# Patient Record
Sex: FEMALE | Race: WHITE | NOT HISPANIC OR LATINO | Employment: FULL TIME | URBAN - NONMETROPOLITAN AREA
[De-identification: names, ages, dates, MRNs, and addresses within clinical notes are randomized per-mention and may not be internally consistent; named-entity substitution may affect disease eponyms.]

---

## 2018-06-04 ENCOUNTER — OFFICE VISIT CONVERTED (OUTPATIENT)
Dept: FAMILY MEDICINE CLINIC | Age: 38
End: 2018-06-04
Attending: NURSE PRACTITIONER

## 2018-12-03 ENCOUNTER — OFFICE VISIT CONVERTED (OUTPATIENT)
Dept: FAMILY MEDICINE CLINIC | Age: 38
End: 2018-12-03
Attending: NURSE PRACTITIONER

## 2019-01-10 ENCOUNTER — HOSPITAL ENCOUNTER (OUTPATIENT)
Dept: LAB | Facility: HOSPITAL | Age: 39
Discharge: HOME | End: 2019-01-10

## 2019-01-11 LAB — VZV IGG SER IA-ACNC: 2917 INDEX

## 2019-01-31 ENCOUNTER — HOSPITAL ENCOUNTER (OUTPATIENT)
Dept: LAB | Facility: HOSPITAL | Age: 39
Discharge: HOME | End: 2019-01-31

## 2019-01-31 LAB
ALBUMIN SERPL-MCNC: 4.2 G/DL (ref 3.5–5)
ALBUMIN/GLOB SERPL: 1.4 {RATIO} (ref 1.4–2.6)
ALP SERPL-CCNC: 80 U/L (ref 42–98)
ALT SERPL-CCNC: 20 U/L (ref 10–40)
ANION GAP SERPL CALC-SCNC: 16 MMOL/L (ref 8–19)
AST SERPL-CCNC: 21 U/L (ref 15–50)
BASOPHILS # BLD AUTO: 0.05 10*3/UL (ref 0–0.2)
BASOPHILS NFR BLD AUTO: 0.54 % (ref 0–3)
BILIRUB SERPL-MCNC: 0.29 MG/DL (ref 0.2–1.3)
BUN SERPL-MCNC: 11 MG/DL (ref 5–25)
BUN/CREAT SERPL: 15 {RATIO} (ref 6–20)
CALCIUM SERPL-MCNC: 8.9 MG/DL (ref 8.7–10.4)
CHLORIDE SERPL-SCNC: 104 MMOL/L (ref 99–111)
CHOLEST SERPL-MCNC: 157 MG/DL (ref 107–200)
CHOLEST/HDLC SERPL: 3.7 {RATIO} (ref 3–6)
CONV CO2: 23 MMOL/L (ref 22–32)
CONV TOTAL PROTEIN: 7.2 G/DL (ref 6.3–8.2)
CREAT UR-MCNC: 0.71 MG/DL (ref 0.5–0.9)
EOSINOPHIL # BLD AUTO: 0.21 10*3/UL (ref 0–0.7)
EOSINOPHIL # BLD AUTO: 2.13 % (ref 0–7)
ERYTHROCYTE [DISTWIDTH] IN BLOOD BY AUTOMATED COUNT: 14 % (ref 11.5–14.5)
EST. AVERAGE GLUCOSE BLD GHB EST-MCNC: 111 MG/DL
GFR SERPLBLD BASED ON 1.73 SQ M-ARVRAT: >60 ML/MIN/{1.73_M2}
GLOBULIN UR ELPH-MCNC: 3 G/DL (ref 2–3.5)
GLUCOSE SERPL-MCNC: 101 MG/DL (ref 65–99)
HBA1C MFR BLD: 11 G/DL (ref 12–16)
HBA1C MFR BLD: 5.5 % (ref 3.5–5.7)
HCT VFR BLD AUTO: 33 % (ref 37–47)
HDLC SERPL-MCNC: 42 MG/DL (ref 40–60)
LDLC SERPL CALC-MCNC: 92 MG/DL (ref 70–100)
LYMPHOCYTES # BLD AUTO: 3.27 10*3/UL (ref 1–5)
MCH RBC QN AUTO: 25.5 PG (ref 27–31)
MCHC RBC AUTO-ENTMCNC: 33.5 G/DL (ref 33–37)
MCV RBC AUTO: 76.2 FL (ref 81–99)
MONOCYTES # BLD AUTO: 0.72 10*3/UL (ref 0.2–1.2)
MONOCYTES NFR BLD AUTO: 7.32 % (ref 3–10)
NEUTROPHILS # BLD AUTO: 5.54 10*3/UL (ref 2–8)
NEUTROPHILS NFR BLD AUTO: 56.6 % (ref 30–85)
NRBC BLD AUTO-RTO: 0 % (ref 0–0.01)
OSMOLALITY SERPL CALC.SUM OF ELEC: 288 MOSM/KG (ref 273–304)
PLATELET # BLD AUTO: 389 10*3/UL (ref 130–400)
PMV BLD AUTO: 6.3 FL (ref 7.4–10.4)
POTASSIUM SERPL-SCNC: 4 MMOL/L (ref 3.5–5.3)
RBC # BLD AUTO: 4.33 10*6/UL (ref 4.2–5.4)
SODIUM SERPL-SCNC: 139 MMOL/L (ref 135–147)
TRIGL SERPL-MCNC: 114 MG/DL (ref 40–150)
TSH SERPL-ACNC: 1.58 M[IU]/L (ref 0.27–4.2)
VARIANT LYMPHS NFR BLD MANUAL: 33.4 % (ref 20–45)
VLDLC SERPL-MCNC: 23 MG/DL (ref 5–37)
WBC # BLD AUTO: 9.79 10*3/UL (ref 4.8–10.8)

## 2019-05-29 ENCOUNTER — OFFICE VISIT CONVERTED (OUTPATIENT)
Dept: FAMILY MEDICINE CLINIC | Age: 39
End: 2019-05-29
Attending: NURSE PRACTITIONER

## 2019-06-04 ENCOUNTER — HOSPITAL ENCOUNTER (OUTPATIENT)
Dept: OTHER | Facility: HOSPITAL | Age: 39
Discharge: HOME | End: 2019-06-04
Attending: NURSE PRACTITIONER

## 2020-01-28 ENCOUNTER — HOSPITAL ENCOUNTER (OUTPATIENT)
Dept: LAB | Facility: HOSPITAL | Age: 40
Discharge: HOME | End: 2020-01-28

## 2020-01-28 LAB
ALBUMIN SERPL-MCNC: 3.9 G/DL (ref 3.5–5)
ALBUMIN/GLOB SERPL: 1.3 {RATIO} (ref 1.4–2.6)
ALP SERPL-CCNC: 84 U/L (ref 42–98)
ALT SERPL-CCNC: 9 U/L (ref 10–40)
ANION GAP SERPL CALC-SCNC: 14 MMOL/L (ref 8–19)
AST SERPL-CCNC: 14 U/L (ref 15–50)
BASOPHILS # BLD AUTO: 0.05 10*3/UL (ref 0–0.2)
BASOPHILS NFR BLD AUTO: 0.6 % (ref 0–3)
BILIRUB SERPL-MCNC: 0.28 MG/DL (ref 0.2–1.3)
BUN SERPL-MCNC: 9 MG/DL (ref 5–25)
BUN/CREAT SERPL: 14 {RATIO} (ref 6–20)
CALCIUM SERPL-MCNC: 8.7 MG/DL (ref 8.7–10.4)
CHLORIDE SERPL-SCNC: 102 MMOL/L (ref 99–111)
CHOLEST SERPL-MCNC: 144 MG/DL (ref 107–200)
CHOLEST/HDLC SERPL: 3.4 {RATIO} (ref 3–6)
CONV ABS IMM GRAN: 0.02 10*3/UL (ref 0–0.2)
CONV CO2: 26 MMOL/L (ref 22–32)
CONV IMMATURE GRAN: 0.2 % (ref 0–1.8)
CONV TOTAL PROTEIN: 6.8 G/DL (ref 6.3–8.2)
CREAT UR-MCNC: 0.66 MG/DL (ref 0.5–0.9)
DEPRECATED RDW RBC AUTO: 43 FL (ref 36.4–46.3)
EOSINOPHIL # BLD AUTO: 0.16 10*3/UL (ref 0–0.7)
EOSINOPHIL # BLD AUTO: 1.9 % (ref 0–7)
ERYTHROCYTE [DISTWIDTH] IN BLOOD BY AUTOMATED COUNT: 14.1 % (ref 11.7–14.4)
GFR SERPLBLD BASED ON 1.73 SQ M-ARVRAT: >60 ML/MIN/{1.73_M2}
GLOBULIN UR ELPH-MCNC: 2.9 G/DL (ref 2–3.5)
GLUCOSE SERPL-MCNC: 90 MG/DL (ref 65–99)
HCT VFR BLD AUTO: 34.6 % (ref 37–47)
HDLC SERPL-MCNC: 42 MG/DL (ref 40–60)
HGB BLD-MCNC: 11 G/DL (ref 12–16)
LDLC SERPL CALC-MCNC: 92 MG/DL (ref 70–100)
LYMPHOCYTES # BLD AUTO: 2.6 10*3/UL (ref 1–5)
LYMPHOCYTES NFR BLD AUTO: 30.5 % (ref 20–45)
MCH RBC QN AUTO: 26.4 PG (ref 27–31)
MCHC RBC AUTO-ENTMCNC: 31.8 G/DL (ref 33–37)
MCV RBC AUTO: 83.2 FL (ref 81–99)
MONOCYTES # BLD AUTO: 0.66 10*3/UL (ref 0.2–1.2)
MONOCYTES NFR BLD AUTO: 7.7 % (ref 3–10)
NEUTROPHILS # BLD AUTO: 5.04 10*3/UL (ref 2–8)
NEUTROPHILS NFR BLD AUTO: 59.1 % (ref 30–85)
NRBC CBCN: 0 % (ref 0–0.7)
OSMOLALITY SERPL CALC.SUM OF ELEC: 284 MOSM/KG (ref 273–304)
PLATELET # BLD AUTO: 373 10*3/UL (ref 130–400)
PMV BLD AUTO: 9.2 FL (ref 9.4–12.3)
POTASSIUM SERPL-SCNC: 3.9 MMOL/L (ref 3.5–5.3)
RBC # BLD AUTO: 4.16 10*6/UL (ref 4.2–5.4)
SODIUM SERPL-SCNC: 138 MMOL/L (ref 135–147)
TRIGL SERPL-MCNC: 51 MG/DL (ref 40–150)
TSH SERPL-ACNC: 1.36 M[IU]/L (ref 0.27–4.2)
VLDLC SERPL-MCNC: 10 MG/DL (ref 5–37)
WBC # BLD AUTO: 8.53 10*3/UL (ref 4.8–10.8)

## 2020-05-21 ENCOUNTER — OFFICE VISIT CONVERTED (OUTPATIENT)
Dept: FAMILY MEDICINE CLINIC | Age: 40
End: 2020-05-21
Attending: NURSE PRACTITIONER

## 2020-10-12 ENCOUNTER — OFFICE VISIT CONVERTED (OUTPATIENT)
Dept: FAMILY MEDICINE CLINIC | Age: 40
End: 2020-10-12
Attending: NURSE PRACTITIONER

## 2020-10-19 ENCOUNTER — HOSPITAL ENCOUNTER (OUTPATIENT)
Dept: OTHER | Facility: HOSPITAL | Age: 40
Discharge: HOME | End: 2020-10-19
Attending: NURSE PRACTITIONER

## 2021-01-07 ENCOUNTER — HOSPITAL ENCOUNTER (OUTPATIENT)
Dept: OTHER | Facility: HOSPITAL | Age: 41
Discharge: HOME | End: 2021-01-07
Attending: INTERNAL MEDICINE

## 2021-01-22 ENCOUNTER — HOSPITAL ENCOUNTER (OUTPATIENT)
Dept: LAB | Facility: HOSPITAL | Age: 41
Discharge: HOME | End: 2021-01-22

## 2021-01-22 LAB
ALBUMIN SERPL-MCNC: 4.2 G/DL (ref 3.5–5)
ALBUMIN/GLOB SERPL: 1.3 {RATIO} (ref 1.4–2.6)
ALP SERPL-CCNC: 78 U/L (ref 42–98)
ALT SERPL-CCNC: 10 U/L (ref 10–40)
ANION GAP SERPL CALC-SCNC: 17 MMOL/L (ref 8–19)
AST SERPL-CCNC: 13 U/L (ref 15–50)
BASOPHILS # BLD AUTO: 0.04 10*3/UL (ref 0–0.2)
BASOPHILS NFR BLD AUTO: 0.5 % (ref 0–3)
BILIRUB SERPL-MCNC: 0.32 MG/DL (ref 0.2–1.3)
BUN SERPL-MCNC: 12 MG/DL (ref 5–25)
BUN/CREAT SERPL: 16 {RATIO} (ref 6–20)
CALCIUM SERPL-MCNC: 9 MG/DL (ref 8.7–10.4)
CHLORIDE SERPL-SCNC: 101 MMOL/L (ref 99–111)
CHOLEST SERPL-MCNC: 149 MG/DL (ref 107–200)
CHOLEST/HDLC SERPL: 3.1 {RATIO} (ref 3–6)
CONV ABS IMM GRAN: 0.01 10*3/UL (ref 0–0.2)
CONV CO2: 24 MMOL/L (ref 22–32)
CONV IMMATURE GRAN: 0.1 % (ref 0–1.8)
CONV TOTAL PROTEIN: 7.4 G/DL (ref 6.3–8.2)
CREAT UR-MCNC: 0.75 MG/DL (ref 0.5–0.9)
DEPRECATED RDW RBC AUTO: 42.8 FL (ref 36.4–46.3)
EOSINOPHIL # BLD AUTO: 0.24 10*3/UL (ref 0–0.7)
EOSINOPHIL # BLD AUTO: 2.7 % (ref 0–7)
ERYTHROCYTE [DISTWIDTH] IN BLOOD BY AUTOMATED COUNT: 14.2 % (ref 11.7–14.4)
GFR SERPLBLD BASED ON 1.73 SQ M-ARVRAT: >60 ML/MIN/{1.73_M2}
GLOBULIN UR ELPH-MCNC: 3.2 G/DL (ref 2–3.5)
GLUCOSE SERPL-MCNC: 98 MG/DL (ref 65–99)
HCT VFR BLD AUTO: 37.7 % (ref 37–47)
HDLC SERPL-MCNC: 48 MG/DL (ref 40–60)
HGB BLD-MCNC: 12.1 G/DL (ref 12–16)
LDLC SERPL CALC-MCNC: 89 MG/DL (ref 70–100)
LYMPHOCYTES # BLD AUTO: 2.68 10*3/UL (ref 1–5)
LYMPHOCYTES NFR BLD AUTO: 30.6 % (ref 20–45)
MCH RBC QN AUTO: 26.5 PG (ref 27–31)
MCHC RBC AUTO-ENTMCNC: 32.1 G/DL (ref 33–37)
MCV RBC AUTO: 82.7 FL (ref 81–99)
MONOCYTES # BLD AUTO: 0.69 10*3/UL (ref 0.2–1.2)
MONOCYTES NFR BLD AUTO: 7.9 % (ref 3–10)
NEUTROPHILS # BLD AUTO: 5.11 10*3/UL (ref 2–8)
NEUTROPHILS NFR BLD AUTO: 58.2 % (ref 30–85)
NRBC CBCN: 0 % (ref 0–0.7)
OSMOLALITY SERPL CALC.SUM OF ELEC: 286 MOSM/KG (ref 273–304)
PLATELET # BLD AUTO: 355 10*3/UL (ref 130–400)
PMV BLD AUTO: 8.7 FL (ref 9.4–12.3)
POTASSIUM SERPL-SCNC: 4 MMOL/L (ref 3.5–5.3)
RBC # BLD AUTO: 4.56 10*6/UL (ref 4.2–5.4)
SODIUM SERPL-SCNC: 138 MMOL/L (ref 135–147)
TRIGL SERPL-MCNC: 61 MG/DL (ref 40–150)
TSH SERPL-ACNC: 1.03 M[IU]/L (ref 0.27–4.2)
VLDLC SERPL-MCNC: 12 MG/DL (ref 5–37)
WBC # BLD AUTO: 8.77 10*3/UL (ref 4.8–10.8)

## 2021-02-02 ENCOUNTER — HOSPITAL ENCOUNTER (OUTPATIENT)
Dept: VACCINE CLINIC | Facility: HOSPITAL | Age: 41
Discharge: HOME | End: 2021-02-02
Attending: INTERNAL MEDICINE

## 2021-03-04 ENCOUNTER — OFFICE VISIT CONVERTED (OUTPATIENT)
Dept: FAMILY MEDICINE CLINIC | Age: 41
End: 2021-03-04
Attending: NURSE PRACTITIONER

## 2021-05-18 NOTE — PROGRESS NOTES
Mazin Steve 1980     Office/Outpatient Visit    Visit Date: Wed, May 29, 2019 02:32 pm    Provider: Mackenzie Hui N.P. (Assistant: Cher Monroe RN)    Location: [facility]        Electronically signed by Mackenzie Hui N.P. on  05/29/2019 09:02:27 PM                             SUBJECTIVE:        CC:     Ms. Steve is a 39 year old White female.  Medication Refills;         HPI:         Ms. Steve presents with gERD.  Symptoms are improved with a proton-pump inhibitor ( Protonix ).          Additionally, she presents with history of anxiety with depression.  current treatment includes Triliptel and BuSpar.  Was on triliptal for depression.  I added buspar for anxiety and that has really helped.     ROS:     CONSTITUTIONAL:  Negative for chills, fatigue, fever, and weight change.      CARDIOVASCULAR:  Negative for chest pain, palpitations, tachycardia, orthopnea, and edema.      RESPIRATORY:  Negative for cough, dyspnea, and hemoptysis.      INTEGUMENTARY/BREAST:  Positive for performance of self breast exams ( on a monthly basis ).   Negative for breast mass.      NEUROLOGICAL:  Negative for dizziness, headaches, paresthesias, and weakness.          [facility] system/SH:     Last Reviewed on 5/29/2019 02:47 PM by Mackenzie Hui    Past Medical History:             PAST MEDICAL HISTORY             GYNECOLOGICAL HISTORY:    G0    No problems with menstrual cycles.    not sexually active         PREVENTIVE HEALTH MAINTENANCE             PAP SMEAR: was last done 2006     MAMMOGRAM: has never been done         Surgical History:     NONE         Family History:     Father: Healthy     Mother: Healthy     Maternal Grandfather: Type 2 Diabetes     Maternal Grandmother: Coronary Artery Disease         Social History:     Occupation: [employer]      Marital Status: Single     Children: None         Tobacco/Alcohol/Supplements:     Last Reviewed on 5/29/2019 02:33 PM by Cher Monroe    Tobacco: She has  never smoked.              Immunizations:     Hep A, adult dose 6/4/2018     Hep A, adult dose 12/26/2018     Influenza A (H1N1), IM (3+ years) Monovalent 1/5/2010     Influenza Virus Vaccine, unspecified formulation 10/1/2016     Influenza Virus Vaccine, unspecified formulation 10/1/2018     Adacel (Tdap) 2/10/2009         Allergies:     Last Reviewed on 5/29/2019 02:33 PM by Cher Monroe      No Known Drug Allergies.         Current Medications:     Last Reviewed on 5/29/2019 02:34 PM by Cher Monroe    Protonix 40mg Tablets, Delayed Release 1 tab daily     Oxcarbazepine 150mg Tablet Take 2 tablet(s) by mouth bid     Buspirone HCl 10mg Tablet 1 tab bid         OBJECTIVE:        Vitals:         Current: 5/29/2019 2:36:54 PM    Ht:  5 ft, 4.25 in;  Wt: 291.4 lbs;  BMI: 49.6    T: 98.2 F (oral);  BP: 134/76 mm Hg (left arm, sitting);  P: 93 bpm (left arm (BP Cuff), sitting);  sCr: 0.79 mg/dL;  GFR: 124.30        Exams:     PHYSICAL EXAM:     GENERAL: vital signs recorded - well developed, well nourished;  no apparent distress;     RESPIRATORY: normal respiratory rate and pattern with no distress; normal breath sounds with no rales, rhonchi, wheezes or rubs;     CARDIOVASCULAR: normal rate; rhythm is regular;  no systolic murmur;     LYMPHATIC: no axillary adenopathy;     BREAST/INTEGUMENT: BREASTS: breast exam is normal without masses, skin changes, or nipple discharge;     PSYCHIATRIC:  appropriate affect and demeanor; normal speech pattern; grossly normal memory;         ASSESSMENT           530.81   K21.9  GERD              DDx:     300.4   F32.8  Anxiety with depression              DDx:     V76.19   Z12.39  Screening breast exam - other              DDx:         ORDERS:         Meds Prescribed:       Refill of: Protonix (Pantoprazole) 40mg Tablets, Delayed Release 1 tab daily  #90 (Ninety) tablet(s) Refills: 1       Refill of: Oxcarbazepine 150mg Tablet Take 2 tablet(s) by mouth bid  #360 ([place]  and Sixty) tablet(s) Refills: 1       Refill of: Buspirone HCl 10mg Tablet 1 tab bid  #180 ([place] [address] [place]) tablet(s) Refills: 1         Radiology/Test Orders:       [id number]  Screening digital breast tomosynthesis bi  (Send-Out)           Other Orders:         Calculated BMI above the upper parameter and a follow-up plan was documented in the medical record  (In-House)                   PLAN:          GERD     MIPS     BMI Elevated - Follow-Up Plan: She was provided education on weight loss strategies           Prescriptions:       Refill of: Protonix (Pantoprazole) 40mg Tablets, Delayed Release 1 tab daily  #90 (Ninety) tablet(s) Refills: 1           Orders:         Calculated BMI above the upper parameter and a follow-up plan was documented in the medical record  (In-House)            Anxiety with depression           Prescriptions:       Refill of: Oxcarbazepine 150mg Tablet Take 2 tablet(s) by mouth bid  #360 ([place] [address] Sixty) tablet(s) Refills: 1       Refill of: Buspirone HCl 10mg Tablet 1 tab bid  #180 ([address] [place] [address]) tablet(s) Refills: 1          Screening breast exam - other         RADIOLOGY:  I have ordered Mammogram Bilateral Screening 3D to be done today.            Orders:       [id number]  Screening digital breast tomosynthesis bi  (Send-Out)               CHARGE CAPTURE           **Please note: ICD descriptions below are intended for billing purposes only and may not represent clinical diagnoses**        Primary Diagnosis:         530.81 GERD            K21.9    Gastro-esophageal reflux disease without esophagitis              Orders:          [id number]   Office/outpatient visit; established patient, level 3  (In-House)                Calculated BMI above the upper parameter and a follow-up plan was documented in the medical record  (In-House)           300.4 Anxiety with depression            F32.8    Other depressive episodes    V76.19 Screening breast exam - other             Z12.39    Encounter for other screening for malignant neoplasm of breast

## 2021-05-18 NOTE — PROGRESS NOTES
Mazin Steve  1980     Office/Outpatient Visit    Visit Date: Mon, Oct 12, 2020 04:31 pm    Provider: Mackenzie Hui N.P. (Assistant: Sandra Paulino, MA)    Location: [facility]        Electronically signed by Mackenzie Hui N.P. on  10/12/2020 05:17:35 PM                             Subjective:        CC: Ms. Steve is a 40 year old White female.  presents today due to left breast pain         HPI:           Patient presents with mastodynia.  She first noticed it one week ago.  It is located left breast.  Her latest mammogram was 2019.      ROS:     CONSTITUTIONAL:  Negative for fever.      CARDIOVASCULAR:  Negative for chest pain, palpitations, tachycardia, orthopnea, and edema.      RESPIRATORY:  Negative for recent cough and dyspnea.      NEUROLOGICAL:  Negative for dizziness, headaches, paresthesias, and weakness.          Past Medical History / Family History / Social History:         Last Reviewed on 10/12/2020 04:43 PM by Mackenzie Hui    Past Medical History:             PAST MEDICAL HISTORY             GYNECOLOGICAL HISTORY:    G0    No problems with menstrual cycles.    not sexually active         PREVENTIVE HEALTH MAINTENANCE             MAMMOGRAM: was last done 06/05/19     PAP SMEAR: was last done 2006         Surgical History:     NONE         Family History:     Father: Healthy     Mother: Healthy     Maternal Grandfather: Type 2 Diabetes     Maternal Grandmother: Coronary Artery Disease         Social History:     Occupation: [employer]      Marital Status: Single     Children: None         Tobacco/Alcohol/Supplements:     Last Reviewed on 10/12/2020 04:34 PM by Sandra Paulino    Tobacco: She has never smoked.          Immunizations:     influenza, injectable, quadrivalent, preservative free 10/1/2019    Hep A, adult dose 6/4/2018    Hep A, adult dose 12/26/2018    Influenza A (H1N1), IM (3+ years) Monovalent 1/5/2010    Influenza Virus Vaccine,  unspecified formulation 10/1/2016    Influenza Virus Vaccine, unspecified formulation 10/1/2018    Adacel (Tdap) 2/10/2009        Allergies:     Last Reviewed on 10/12/2020 04:34 PM by Sandra Paulino    No Known Allergies.        Current Medications:     Last Reviewed on 10/12/2020 04:34 PM by Sandra Paulino    OXcarbazepine 150 mg oral tablet [Take 2 tablet(s) by mouth bid]    busPIRone 10 mg oral tablet [1 tab bid]        Objective:        Vitals:         Current: 10/12/2020 4:36:49 PM    Ht:  5 ft, 4.25 in;  Wt: 272.8 lbs;  BMI: 46.5T: 96.6 F (oral);  BP: 134/82 mm Hg (left arm, sitting);  P: 96 bpm (left arm (BP Cuff), sitting);  sCr: 0.66 mg/dL;  GFR: 143.25        Exams:     PHYSICAL EXAM:     GENERAL: vital signs recorded - well developed, well nourished;  no apparent distress;     RESPIRATORY: normal respiratory rate and pattern with no distress; normal breath sounds with no rales, rhonchi, wheezes or rubs;     CARDIOVASCULAR: normal rate; rhythm is regular;  no systolic murmur;     LYMPHATIC: no axillary adenopathy;     BREAST/INTEGUMENT: no breast masses, nipple discharge or skin changes of either breast, pain to palpation of left upper medial left breast;     PSYCHIATRIC:  appropriate affect and demeanor; normal speech pattern; grossly normal memory;         Assessment:         N64.4   Mastodynia           ORDERS:         Radiology/Test Orders:       [id number]  Diagnostic mammography computer-aided detcj bi  (Send-Out)            [id number]  Mammography; bilateral  (Send-Out)                      Plan:         Mastodyniahad her flu vaccine at work /check routine mammogram on right and dg mammogram on left and ultrasound if needed        RADIOLOGY:  I have ordered a diagnostic mammogram on both breasts Diagnostic Bilateral No CAD to be done today.            Orders:       [id number]  Diagnostic mammography computer-aided detcj bi  (Send-Out)            [id number]  Mammography; bilateral  (Send-Out)                   Charge Capture:         Primary Diagnosis:     N64.4  Mastodynia           Orders:      [id number]  Office/outpatient visit; established patient, level 3  (In-House)

## 2021-05-18 NOTE — PROGRESS NOTES
Mazin Steve 1980     Office/Outpatient Visit    Visit Date: Mon, Dec 3, 2018 01:46 pm    Provider: Mackenzie Hui N.P. (Assistant: Sandra Paulino MA)    Location: [facility]        Electronically signed by Mackenzie Hui N.P. on  12/03/2018 03:46:58 PM                             SUBJECTIVE:        CC:     Ms. Steve is a 38 year old White female.  discuss med for anxiety;         HPI:         Patient presents with depression.  Current medications include other neurologics, triliptal.  under a lot of stress for work, started in august /considering starting a new job, so going to look for a job causes more anxiety /knows she needs counseling /has a name but not set up an appt /joel gaffney (has been on celexa, zoloft, viibryd and effexor in the past and did not find they helped)     ROS:     CONSTITUTIONAL:  Negative for chills, fatigue, fever, and weight change.      CARDIOVASCULAR:  Negative for chest pain, palpitations, tachycardia, orthopnea, and edema.      RESPIRATORY:  Negative for cough, dyspnea, and hemoptysis.      GASTROINTESTINAL:  Positive for acid reflux symptoms ( PPI helps ).      NEUROLOGICAL:  Negative for dizziness, headaches, paresthesias, and weakness.      PSYCHIATRIC:  Negative for suicidal thoughts.          PMH/FMH/SH:     Last Reviewed on 12/03/2018 02:22 PM by Mackenzie Hui    Past Medical History:             PAST MEDICAL HISTORY             GYNECOLOGICAL HISTORY:    G0    No problems with menstrual cycles.    not sexually active         PREVENTIVE HEALTH MAINTENANCE             PAP SMEAR: was last done 2006     MAMMOGRAM: has never been done         Family History:     Father: Healthy     Mother: Healthy     Maternal Grandfather: Type 2 Diabetes     Maternal Grandmother: Coronary Artery Disease         Social History:     Occupation: as a histotech at [employer]     Marital Status: Single     Children: None         Tobacco/Alcohol/Supplements:     Last Reviewed  on 12/03/2018 01:49 PM by Sandra Paulino    Tobacco: She has never smoked.              Immunizations:     Hep A, adult dose 6/4/2018     Influenza A (H1N1), IM (3+ years) Monovalent 1/5/2010     Influenza Virus Vaccine, unspecified formulation 10/1/2016     Adacel (Tdap) 2/10/2009         Allergies:     Last Reviewed on 12/03/2018 01:49 PM by Sandra Paulino      No Known Drug Allergies.         Current Medications:     Last Reviewed on 12/03/2018 01:49 PM by Sandra Paulino    Oxcarbazepine 150mg Tablet Take 2 tablet(s) by mouth bid     Protonix 40mg Tablets, Delayed Release 1 tab daily         OBJECTIVE:        Vitals:         Current: 12/3/2018 1:51:32 PM    Ht:  5 ft, 4.25 in;  Wt: 297.8 lbs;  BMI: 50.7    T: 98.8 F (oral);  BP: 129/66 mm Hg (left arm, sitting);  P: 81 bpm (left arm (BP Cuff), sitting);  sCr: 0.79 mg/dL;  GFR: 126.67        Exams:     PHYSICAL EXAM:     GENERAL: vital signs recorded - well developed, well nourished;  no apparent distress;     NECK: carotid exam reveals no bruits;     RESPIRATORY: normal respiratory rate and pattern with no distress; normal breath sounds with no rales, rhonchi, wheezes or rubs;     CARDIOVASCULAR: normal rate; rhythm is regular;  no systolic murmur; no edema;     PSYCHIATRIC:  appropriate affect and demeanor; normal speech pattern; grossly normal memory;         ASSESSMENT           300.4   F32.8  Anxiety with depression              DDx:     530.81   K21.9  GERD              DDx:         ORDERS:         Meds Prescribed:       Buspirone HCl 10mg Tablet 1 tab bid  #60 (Sixty) tablet(s) Refills: 1       Refill of: Protonix (Pantoprazole) 40mg Tablets, Delayed Release 1 tab daily  #90 (Ninety) tablet(s) Refills: 1                 PLAN:          Anxiety with depression to call joel gaffney and set up an appt, consider referral to [facility], continue  oxcarbazepine/she is not sexually active /trial of buspar, to take 1/2 dose BID the first few days and  see how she does         FOLLOW-UP: Advised to call if there is no improvement in 3-4 week(s).            Prescriptions:       Buspirone HCl 10mg Tablet 1 tab bid  #60 (Sixty) tablet(s) Refills: 1          GERD           Prescriptions:       Refill of: Protonix (Pantoprazole) 40mg Tablets, Delayed Release 1 tab daily  #90 (Ninety) tablet(s) Refills: 1             CHARGE CAPTURE           **Please note: ICD descriptions below are intended for billing purposes only and may not represent clinical diagnoses**        Primary Diagnosis:         300.4 Anxiety with depression            F32.8    Other depressive episodes              Orders:          [id number]   Office/outpatient visit; established patient, level 3  (In-House)           530.81 GERD            K21.9    Gastro-esophageal reflux disease without esophagitis

## 2021-05-18 NOTE — PROGRESS NOTES
Mazin Steve 1980     Office/Outpatient Visit    Visit Date: Mon, Jun 4, 2018 02:04 pm    Provider: Mackenzie Hui N.P. (Assistant: Sandra Paulnio MA)    Location: [facility]        Electronically signed by Mackenzie Hui N.P. on  06/04/2018 09:19:59 PM                             SUBJECTIVE:        CC:     Ms. Steve is a 38 year old White female.  This is a follow-up visit.  med refill; taking zantac         HPI:         Ms. Steve presents in follow up from ER. She was seen in the ER on 5-26-18.  taking 150 mg daily 3 weeks/ She was diagnosed with gastritis.  The following lab tests were done: CBC ( 11.0 ).  The patient received the following prescriptions: phenergan and GI cocktail.  The patient's course has improved.  She has started taking zantac OTC and it helps.  Aggravating factors include lying down  and eating late.      ROS:     CONSTITUTIONAL:  Negative for chills, fatigue, fever, and weight change.      CARDIOVASCULAR:  Positive for palpitations ( if she does not take zantac ).      RESPIRATORY:  Negative for cough, dyspnea, and hemoptysis.      GASTROINTESTINAL:  Negative for abdominal pain and nausea.      NEUROLOGICAL:  Negative for dizziness, headaches, paresthesias, and weakness.      PSYCHIATRIC:  Negative for mood swings ( (doing well on rx) ).          PMH/FMH/SH:     Last Reviewed on 6/04/2018 08:45 PM by Mackenzie Hui    Past Medical History:             PAST MEDICAL HISTORY             GYNECOLOGICAL HISTORY:    G0    No problems with menstrual cycles.    not sexually active         PREVENTIVE HEALTH MAINTENANCE             PAP SMEAR: was last done 2006     MAMMOGRAM: has never been done         Surgical History:     NONE         Family History:     Father: Healthy     Mother: Healthy     Maternal Grandfather: Type 2 Diabetes     Maternal Grandmother: Coronary Artery Disease         Social History:     Occupation: as a [employer] at [employer]     Marital Status: Single      Children: None         Tobacco/Alcohol/Supplements:     Last Reviewed on 6/04/2018 02:08 PM by Sandra Paulino    Tobacco: She has never smoked.              Immunizations:     Influenza A (H1N1), IM (3+ years) Monovalent 1/5/2010     Influenza Virus Vaccine, unspecified formulation 10/1/2016     Adacel (Tdap) 2/10/2009         Allergies:     Last Reviewed on 8/22/2017 01:57 PM by Gayle Hui      No Known Drug Allergies.         Current Medications:     Last Reviewed on 8/22/2017 01:57 PM by Gayle Lozada    Oxcarbazepine 150mg Tablet Take 2 tablet(s) by mouth bid         OBJECTIVE:        Vitals:         Current: 6/4/2018 2:07:59 PM    Ht:  5 ft, 4.25 in;  Wt: 291.6 lbs;  BMI: 49.7    T: 98.4 F (oral);  BP: 118/74 mm Hg (left arm, sitting);  P: 96 bpm (finger clip, sitting);  sCr: 0.79 mg/dL;  GFR: 125.54        Exams:     PHYSICAL EXAM:     GENERAL: vital signs recorded - well developed, well nourished;  no apparent distress;     NECK: carotid exam reveals no bruits;     RESPIRATORY: normal respiratory rate and pattern with no distress; normal breath sounds with no rales, rhonchi, wheezes or rubs;     CARDIOVASCULAR: normal rate; rhythm is regular;  no systolic murmur; no edema;     PSYCHIATRIC:  appropriate affect and demeanor; normal speech pattern; grossly normal memory;         Procedures:     Vaccination against viral hepatitis     1. Hepatitis A (adult): 1.0 ml given IM in the left upper arm; administered by [facility];  lot number ; expires 11.21.20             ASSESSMENT           530.81   K21.9  GERD              DDx:     V05.3   Z23  Vaccination against viral hepatitis              DDx:     300.4   F32.8  Anxiety with depression              DDx:         ORDERS:         Meds Prescribed:       Refill of: Oxcarbazepine 150mg Tablet Take 2 tablet(s) by mouth bid  #360 ([place] [address] Sixty) tablet(s) Refills: 0       Protonix (Pantoprazole)  40mg Tablets, Delayed Release 1 tab daily  #90  (Ninety) tablet(s) Refills: 0         Procedures Ordered:       [id number]  Immunization administration; one vaccine  (In-House)         14483  HepA vaccine adult dose for intramuscular use  (In-House)                   PLAN:          GERD reviewed ER records and all her labs, start taking her zantac BID, she wants the PPI sent to her mail order         RECOMMENDATIONS given include: elevation of the head of the bed, avoidance of spicy foods, weight loss, and avoid eating 3-4 hours before bed.      FOLLOW-UP: Advised to call if there is no improvement in 4-6 week(s).            Prescriptions:       Protonix (Pantoprazole)  40mg Tablets, Delayed Release 1 tab daily  #90 (Ninety) tablet(s) Refills: 0           Patient Education Handouts:       Heartburn (Gerd)           Vaccination against viral hepatitis             FOLLOW-UP: Schedule a follow-up appointment in 6 months.            Orders:       [id number]  Immunization administration; one vaccine  (In-House)         99597  HepA vaccine adult dose for intramuscular use  (In-House)            Anxiety with depression does not want to follow up with psychiatry, is stable on her rx, will refill rx           Prescriptions:       Refill of: Oxcarbazepine 150mg Tablet Take 2 tablet(s) by mouth bid  #360 ([place] [address]) tablet(s) Refills: 0             Patient Recommendations:        For  GERD:     Elevate the head of the bed slightly; this can be done by placing a 2x4 piece of lumbar under the feet of the bed frame. Avoid spicy foods if they tend to make your symptoms worse. Lose weight. Avoid eating 3-4 hours before bedtime.          For  Vaccination against viral hepatitis:                 FOLLOW-UP: Schedule a follow-up visit in 6 months.              CHARGE CAPTURE           **Please note: ICD descriptions below are intended for billing purposes only and may not represent clinical diagnoses**        Primary Diagnosis:         530.81 GERD            K21.9     Gastro-esophageal reflux disease without esophagitis              Orders:          [id number]   Office/outpatient visit; established patient, level 4  (In-House)           V05.3 Vaccination against viral hepatitis            Z23    Encounter for immunization              Orders:          [id number]   Immunization administration; one vaccine  (In-House)             07805   HepA vaccine adult dose for intramuscular use  (In-House)           300.4 Anxiety with depression            F32.8    Other depressive episodes

## 2021-05-18 NOTE — PROGRESS NOTES
Mazin Steve  1980     Office/Outpatient Visit    Visit Date: Thu, May 21, 2020 02:34 pm    Provider: Mackenzie Hui N.P. (Assistant: Spurling, Sarah C, MA)    Location: [facility]        Electronically signed by Mackenzie Hui N.P. on  05/21/2020 03:09:16 PM                             Subjective:        CC: Ms. Steve is a 40 year old White female.  This is a follow-up visit.  refills, phone call [phone];         HPI:           Symptoms are relieved with buspar and oacarbazepine.  Today's encounter is being done with a telehealth visit. She has consented verbally with two witnesses for todays treatment. Todays visit is being conducted by audio only. Individuals present during the telemedicine consultation include ashley Retana and RISA Yan     ROS:     CONSTITUTIONAL:  Negative for fever.      CARDIOVASCULAR:  Negative for chest pain, palpitations, tachycardia, orthopnea, and edema.      RESPIRATORY:  Negative for recent cough and dyspnea.      NEUROLOGICAL:  Negative for dizziness, headaches, paresthesias, and weakness.      PSYCHIATRIC:  Negative for suicidal thoughts.          Past Medical History / Family History / Social History:         Last Reviewed on 5/21/2020 02:58 PM by Mackenzie Hui    Past Medical History:             PAST MEDICAL HISTORY             GYNECOLOGICAL HISTORY:    G0    No problems with menstrual cycles.    not sexually active         PREVENTIVE HEALTH MAINTENANCE             MAMMOGRAM: was last done 06/05/19     PAP SMEAR: was last done 2006         Surgical History:     NONE         Family History:     Father: Healthy     Mother: Healthy     Maternal Grandfather: Type 2 Diabetes     Maternal Grandmother: Coronary Artery Disease         Social History:     Occupation: [employer]      Marital Status: Single     Children: None         Tobacco/Alcohol/Supplements:     Last Reviewed on 5/21/2020 02:34 PM by Spurling, Sarah C    Tobacco: She  has never smoked.          Current Problems:     Last Reviewed on 5/21/2020 02:34 PM by Spurling, Sarah C    Other depressive episodes    Pain in left elbow    Plantar wart    Gastro-esophageal reflux disease without esophagitis        Immunizations:     Hep A, adult dose 6/4/2018    Hep A, adult dose 12/26/2018    Influenza A (H1N1), IM (3+ years) Monovalent 1/5/2010    Influenza Virus Vaccine, unspecified formulation 10/1/2016    Influenza Virus Vaccine, unspecified formulation 10/1/2018    Adacel (Tdap) 2/10/2009        Allergies:     Last Reviewed on 5/21/2020 02:34 PM by Spurling, Sarah C    No Known Allergies.        Current Medications:     Last Reviewed on 5/21/2020 02:35 PM by Spurling, Sarah C    OXcarbazepine 150 mg oral tablet [Take 2 tablet(s) by mouth bid]    busPIRone 10 mg oral tablet [1 tab bid]        Assessment:         F34.89   Other specified persistent mood disorders           ORDERS:         Meds Prescribed:       [Refilled] OXcarbazepine 150 mg oral tablet [Take 2 tablet(s) by mouth bid], #360 (three hundred and sixty) tablets, Refills: 0 (zero)       [Refilled] busPIRone 10 mg oral tablet [1 tab bid], #180 (one hundred and eighty) tablets, Refills: 0 (zero)                 Plan:         Other specified persistent mood disorderssend for employee labs done at [facility]        RECOMMENDATIONS given include: discusse eating healthy, regular exercise, monthly BSE, had a neg mammogram 6-2019.  Telehealth: Verbal consent obtained for visit to occur via phone call; Total time spent was 7 minutes; [id number]--Qghdqygmg E/M 5-10 minutes     FOLLOW-UP: Schedule follow-up appointments on a p.r.n. basis.:.            Prescriptions:       [Refilled] OXcarbazepine 150 mg oral tablet [Take 2 tablet(s) by mouth bid], #360 (three hundred and sixty) tablets, Refills: 0 (zero)       [Refilled] busPIRone 10 mg oral tablet [1 tab bid], #180 (one hundred and eighty) tablets, Refills: 0 (zero)             Patient  Recommendations:        For  Other specified persistent mood disorders:    Schedule follow-up appointments as needed.                APPOINTMENT INFORMATION:        Monday Tuesday Wednesday Thursday Friday Saturday Sunday            Time:___________________AM  PM   Date:_____________________             Charge Capture:         Primary Diagnosis:     F34.89  Other specified persistent mood disorders           Orders:      [id number]  Phys/QHP telephone evaluation 5-10 min  (In-House)

## 2021-05-18 NOTE — PROGRESS NOTES
Mazin Steve  1980     Office/Outpatient Visit    Visit Date: Thu, Mar 4, 2021 02:23 pm    Provider: Mackenzie Hui N.P. (Assistant: Krys Schultz MA)    Location: [facility]        Electronically signed by Mackenzie Hui N.P. on 03/04/2021 02:39:18 pm  Subjective:        CC: [phone] - audio onlyMsCheyenne Steve is a 41 year old White female.  This is a follow-up visit.  check up, medication refills.;         HPI:       other depressive disorders     Symptoms are relieved with Oxcarbazepine and buspar (takes that only at night due to some nausea).  There are no associated symptoms.  Today's encounter is being done with a telehealth visit. She has consented verbally with two witnesses for todays treatment. Todays visit is being conducted by audio only. Individuals present during the telemedicine consultation include estuardo Retana &  RISA Casanova     ROS:     CONSTITUTIONAL:  Negative for fever.      CARDIOVASCULAR:  Negative for chest pain, palpitations, tachycardia, orthopnea, and edema.      RESPIRATORY:  Negative for recent cough and dyspnea.      NEUROLOGICAL:  Negative for dizziness, headaches, paresthesias, and weakness.      PSYCHIATRIC:  Positive for expected stress with occupation.          Past Medical History / Family History / Social History:         Last Reviewed on 3/04/2021 02:34 PM by Mackenzie Hui    Past Medical History:             PAST MEDICAL HISTORY             GYNECOLOGICAL HISTORY:    G0    No problems with menstrual cycles.    not sexually active         PREVENTIVE HEALTH MAINTENANCE             MAMMOGRAM: was last done 06/05/19     PAP SMEAR: was last done 2006         Surgical History:     NONE         Family History:     Father: Healthy     Mother: Healthy     Maternal Grandfather: Type 2 Diabetes     Maternal Grandmother: Coronary Artery Disease         Social History:     Occupation: [employer]      Marital Status: Single     Children: None          Tobacco/Alcohol/Supplements:     Last Reviewed on 10/12/2020 04:34 PM by Sandra Paulino    Tobacco: She has never smoked.          Substance Abuse History:     Last Reviewed on 9/14/2016 11:28 AM by Berta Avelar    NEGATIVE         Mental Health History:     Last Reviewed on 9/14/2016 11:28 AM by Berta Avelar        Major Depression         Communicable Diseases (eg STDs):     Last Reviewed on 9/14/2016 11:28 AM by Berta Avelar    Reportable health conditions; NEGATIVE         Immunizations:     influenza, injectable, quadrivalent, preservative free 10/1/2019    Hep A, adult dose 6/4/2018    Hep A, adult dose 12/26/2018    Influenza A (H1N1), IM (3+ years) Monovalent 1/5/2010    Influenza Virus Vaccine, unspecified formulation 10/1/2016    Influenza Virus Vaccine, unspecified formulation 10/1/2018    Adacel (Tdap) 2/10/2009        Allergies:     Last Reviewed on 3/04/2021 02:25 PM by Krys Schultz    No Known Allergies.        Current Medications:     Last Reviewed on 3/04/2021 02:37 PM by Mackenzie Hui    OXcarbazepine 150 mg oral tablet [Take 2 tablets by mouth twice daily]    busPIRone 10 mg oral tablet [Take 1 tablet by mouth twice daily]taking once a day /ab        Assessment:         F41.3   Other mixed anxiety disorders           ORDERS:         Meds Prescribed:       [Refilled] busPIRone 10 mg oral tablet [Take 1 tablet by mouth twice daily], #180 (one hundred and eighty) tablets, Refills: 0 (zero)       [Refilled] OXcarbazepine 150 mg oral tablet [Take 2 tablets by mouth twice daily], #360 (three hundred and sixty) tablets, Refills: 1 (one)         Lab Orders:       APPTO  Appointment need  (In-House)                      Plan:         Other mixed anxiety disordershas had her covid vaccines /to try taking buspar in the am with food and see how she does    Telehealth: Verbal consent obtained for visit to occur via phone call; Total time spent was 7 minutes; [id number]--Qulhjbtye E/M  5-10 minutes     FOLLOW-UP: Schedule a follow-up visit in 6 months.:.            Prescriptions:       [Refilled] busPIRone 10 mg oral tablet [Take 1 tablet by mouth twice daily], #180 (one hundred and eighty) tablets, Refills: 0 (zero)       [Refilled] OXcarbazepine 150 mg oral tablet [Take 2 tablets by mouth twice daily], #360 (three hundred and sixty) tablets, Refills: 1 (one)           Orders:       APPTO  Appointment need  (In-House)                  Patient Recommendations:        For  Other mixed anxiety disorders:    Schedule a follow-up visit in 6 months.                APPOINTMENT INFORMATION:        Monday Tuesday Wednesday Thursday Friday Saturday Sunday            Time:___________________AM  PM   Date:_____________________             Charge Capture:         Primary Diagnosis:     F41.3  Other mixed anxiety disorders           Orders:      [id number]  Phys/QHP telephone evaluation 5-10 min  (In-House)            APPTO  Appointment need  (In-House)

## 2021-07-01 VITALS
WEIGHT: 293 LBS | TEMPERATURE: 98.8 F | SYSTOLIC BLOOD PRESSURE: 129 MMHG | BODY MASS INDEX: 50.02 KG/M2 | DIASTOLIC BLOOD PRESSURE: 66 MMHG | HEIGHT: 64 IN | HEART RATE: 81 BPM

## 2021-07-01 VITALS
TEMPERATURE: 98.4 F | HEIGHT: 64 IN | BODY MASS INDEX: 49.78 KG/M2 | HEART RATE: 96 BPM | SYSTOLIC BLOOD PRESSURE: 118 MMHG | DIASTOLIC BLOOD PRESSURE: 74 MMHG | WEIGHT: 291.6 LBS

## 2021-07-01 VITALS
HEART RATE: 93 BPM | HEIGHT: 64 IN | WEIGHT: 291.4 LBS | TEMPERATURE: 98.2 F | SYSTOLIC BLOOD PRESSURE: 134 MMHG | BODY MASS INDEX: 49.75 KG/M2 | DIASTOLIC BLOOD PRESSURE: 76 MMHG

## 2021-07-02 VITALS
WEIGHT: 272.8 LBS | SYSTOLIC BLOOD PRESSURE: 134 MMHG | BODY MASS INDEX: 46.57 KG/M2 | HEIGHT: 64 IN | TEMPERATURE: 96.6 F | HEART RATE: 96 BPM | DIASTOLIC BLOOD PRESSURE: 82 MMHG

## 2021-11-17 ENCOUNTER — OFFICE VISIT (OUTPATIENT)
Dept: FAMILY MEDICINE CLINIC | Age: 41
End: 2021-11-17

## 2021-11-17 VITALS
SYSTOLIC BLOOD PRESSURE: 126 MMHG | WEIGHT: 275 LBS | HEIGHT: 64 IN | HEART RATE: 78 BPM | BODY MASS INDEX: 46.95 KG/M2 | TEMPERATURE: 98 F | DIASTOLIC BLOOD PRESSURE: 76 MMHG

## 2021-11-17 DIAGNOSIS — F41.8 MIXED ANXIETY AND DEPRESSIVE DISORDER: ICD-10-CM

## 2021-11-17 DIAGNOSIS — Z12.31 ENCOUNTER FOR SCREENING MAMMOGRAM FOR MALIGNANT NEOPLASM OF BREAST: Primary | ICD-10-CM

## 2021-11-17 DIAGNOSIS — F41.9 ANXIETY: ICD-10-CM

## 2021-11-17 PROCEDURE — 99213 OFFICE O/P EST LOW 20 MIN: CPT | Performed by: NURSE PRACTITIONER

## 2021-11-17 RX ORDER — BUSPIRONE HYDROCHLORIDE 10 MG/1
10 TABLET ORAL DAILY
COMMUNITY
End: 2021-11-17 | Stop reason: SDUPTHER

## 2021-11-17 RX ORDER — BUSPIRONE HYDROCHLORIDE 10 MG/1
10 TABLET ORAL DAILY
Qty: 90 TABLET | Refills: 3 | Status: SHIPPED | OUTPATIENT
Start: 2021-11-17 | End: 2022-11-28 | Stop reason: SDUPTHER

## 2021-11-17 RX ORDER — OXCARBAZEPINE 150 MG/1
300 TABLET, FILM COATED ORAL 2 TIMES DAILY
Qty: 360 TABLET | Refills: 0 | Status: SHIPPED | OUTPATIENT
Start: 2021-11-17 | End: 2022-05-09 | Stop reason: DRUGHIGH

## 2021-11-17 NOTE — PROGRESS NOTES
"Mazin Steve presents to [facility].    Chief Complaint:  Depression (discuss medications), Anxiety (discuss medications), and Referral (mammogram)         History of Present Illness:  Anxiety/ Depression/ mood disorder   Current medication/buspar 10 mg lundberg  Oxcarbazepine: 150 mg 2 BID   Tolerating medication Yes  Current symptoms: rx's working for her     Screening for breast cancer last mammogram / no masses     Covid vaccines/ Moderna at work     PAST MEDICAL HISTORY changes since 3-2021: none             GYNECOLOGICAL HISTORY:    G0    No problems with menstrual cycles.    not sexually active         PREVENTIVE HEALTH MAINTENANCE             MAMMOGRAM: was last done 06/05/19     PAP SMEAR: was last done 2006         Surgical History:     NONE         Family History:     Father: Healthy     Mother: Healthy     Maternal Grandfather: Type 2 Diabetes     Maternal Grandmother: Coronary Artery Disease         Social History:     Occupation: [employer]      Marital Status: Single     Children: None       Review of Systems:  Review of Systems   Constitutional: Negative for fatigue and fever.   Respiratory: Negative for cough and shortness of breath.    Cardiovascular: Negative for chest pain, palpitations and leg swelling.   Neurological: Negative for numbness.          Vital Signs:   /76 (BP Location: Right arm, Patient Position: Sitting, Cuff Size: Large Adult)   Pulse 78   Temp 98 °F (36.7 °C) (Oral)   Ht 163.2 cm (64.25\")   Wt 125 kg (275 lb)   BMI 46.83 kg/m²       Physical Exam:  Physical Exam  Vitals reviewed.   Constitutional:       General: She is not in acute distress.     Appearance: Normal appearance. She is obese.   Cardiovascular:      Rate and Rhythm: Normal rate and regular rhythm.      Heart sounds: Normal heart sounds. No murmur heard.      Pulmonary:      Effort: Pulmonary effort is normal. No respiratory distress.      Breath sounds: Normal breath " sounds.   Chest:   Breasts:      Right: Normal. No mass, nipple discharge, skin change or axillary adenopathy.      Left: Normal. No mass, nipple discharge, skin change or axillary adenopathy.       Lymphadenopathy:      Upper Body:      Right upper body: No axillary adenopathy.      Left upper body: No axillary adenopathy.   Neurological:      Mental Status: She is alert.   Psychiatric:         Mood and Affect: Mood normal.         Behavior: Behavior normal.         Result Review      The following data was reviewed by: RISA Jones on 11/17/2021:    No results found for this or any previous visit.            Assessment and Plan:          Diagnoses and all orders for this visit:    1. Encounter for screening mammogram for malignant neoplasm of breast (Primary)  Assessment & Plan:  Reviewed her last mammogram, advised monthly BSE   Send for her covid vaccines     Orders:  -     Mammo Screening Digital Tomosynthesis Bilateral With CAD; Future    2. Anxiety  Assessment & Plan:  Taking buspar and oxcarbazine,continue,  will be due labs in 1-2022, future order place /reviewed 1-2021 labs     Orders:  -     busPIRone (BUSPAR) 10 MG tablet; Take 1 tablet by mouth Daily.  Dispense: 90 tablet; Refill: 3  -     OXcarbazepine (TRILEPTAL) 150 MG tablet; Take 2 tablets by mouth 2 (Two) Times a Day.  Dispense: 360 tablet; Refill: 0  -     Comprehensive Metabolic Panel; Future  -     CBC & Differential; Future    3. Mixed anxiety and depressive disorder  Assessment & Plan:  Taking buspar and oxcarbazine,continue,  will be due labs in 1-2022, future order place /reviewed 1-2021 labs           Follow Up   Return for for labs in 1-2022.  Patient was given instructions and counseling regarding her condition or for health maintenance advice. Please see specific information pulled into the AVS if appropriate.

## 2021-11-17 NOTE — ASSESSMENT & PLAN NOTE
Taking buspar and oxcarbazine,continue,  will be due labs in 1-2022, future order place /reviewed 1-2021 labs

## 2022-01-17 ENCOUNTER — HOSPITAL ENCOUNTER (OUTPATIENT)
Dept: MAMMOGRAPHY | Facility: HOSPITAL | Age: 42
Discharge: HOME | End: 2022-01-17
Admitting: NURSE PRACTITIONER

## 2022-01-17 DIAGNOSIS — Z12.31 ENCOUNTER FOR SCREENING MAMMOGRAM FOR MALIGNANT NEOPLASM OF BREAST: ICD-10-CM

## 2022-01-17 PROCEDURE — 77063 BREAST TOMOSYNTHESIS BI: CPT

## 2022-01-17 PROCEDURE — 77067 SCR MAMMO BI INCL CAD: CPT

## 2022-03-29 ENCOUNTER — OFFICE VISIT (OUTPATIENT)
Dept: FAMILY MEDICINE CLINIC | Age: 42
End: 2022-03-29

## 2022-03-29 ENCOUNTER — LAB (OUTPATIENT)
Dept: LAB | Facility: HOSPITAL | Age: 42
End: 2022-03-29

## 2022-03-29 VITALS
HEART RATE: 87 BPM | SYSTOLIC BLOOD PRESSURE: 128 MMHG | DIASTOLIC BLOOD PRESSURE: 87 MMHG | BODY MASS INDEX: 46.64 KG/M2 | WEIGHT: 273.2 LBS | HEIGHT: 64 IN | TEMPERATURE: 98.1 F

## 2022-03-29 DIAGNOSIS — F41.8 MIXED ANXIETY AND DEPRESSIVE DISORDER: Primary | ICD-10-CM

## 2022-03-29 DIAGNOSIS — F41.9 ANXIETY: ICD-10-CM

## 2022-03-29 LAB
ALBUMIN SERPL-MCNC: 4.4 G/DL (ref 3.5–5.2)
ALBUMIN/GLOB SERPL: 1.7 G/DL
ALP SERPL-CCNC: 75 U/L (ref 39–117)
ALT SERPL W P-5'-P-CCNC: 14 U/L (ref 1–33)
ANION GAP SERPL CALCULATED.3IONS-SCNC: 9.1 MMOL/L (ref 5–15)
AST SERPL-CCNC: 14 U/L (ref 1–32)
BASOPHILS # BLD AUTO: 0.03 10*3/MM3 (ref 0–0.2)
BASOPHILS NFR BLD AUTO: 0.4 % (ref 0–1.5)
BILIRUB SERPL-MCNC: 0.2 MG/DL (ref 0–1.2)
BUN SERPL-MCNC: 12 MG/DL (ref 6–20)
BUN/CREAT SERPL: 19 (ref 7–25)
CALCIUM SPEC-SCNC: 9.3 MG/DL (ref 8.6–10.5)
CHLORIDE SERPL-SCNC: 103 MMOL/L (ref 98–107)
CO2 SERPL-SCNC: 26.9 MMOL/L (ref 22–29)
CREAT SERPL-MCNC: 0.63 MG/DL (ref 0.57–1)
DEPRECATED RDW RBC AUTO: 41 FL (ref 37–54)
EGFRCR SERPLBLD CKD-EPI 2021: 113 ML/MIN/1.73
EOSINOPHIL # BLD AUTO: 0.19 10*3/MM3 (ref 0–0.4)
EOSINOPHIL NFR BLD AUTO: 2.3 % (ref 0.3–6.2)
ERYTHROCYTE [DISTWIDTH] IN BLOOD BY AUTOMATED COUNT: 13 % (ref 12.3–15.4)
GLOBULIN UR ELPH-MCNC: 2.6 GM/DL
GLUCOSE SERPL-MCNC: 91 MG/DL (ref 65–99)
HCT VFR BLD AUTO: 38.3 % (ref 34–46.6)
HGB BLD-MCNC: 12.2 G/DL (ref 12–15.9)
IMM GRANULOCYTES # BLD AUTO: 0 10*3/MM3 (ref 0–0.05)
IMM GRANULOCYTES NFR BLD AUTO: 0 % (ref 0–0.5)
LYMPHOCYTES # BLD AUTO: 2.26 10*3/MM3 (ref 0.7–3.1)
LYMPHOCYTES NFR BLD AUTO: 27.1 % (ref 19.6–45.3)
MCH RBC QN AUTO: 27.4 PG (ref 26.6–33)
MCHC RBC AUTO-ENTMCNC: 31.9 G/DL (ref 31.5–35.7)
MCV RBC AUTO: 86.1 FL (ref 79–97)
MONOCYTES # BLD AUTO: 0.71 10*3/MM3 (ref 0.1–0.9)
MONOCYTES NFR BLD AUTO: 8.5 % (ref 5–12)
NEUTROPHILS NFR BLD AUTO: 5.14 10*3/MM3 (ref 1.7–7)
NEUTROPHILS NFR BLD AUTO: 61.7 % (ref 42.7–76)
PLATELET # BLD AUTO: 293 10*3/MM3 (ref 140–450)
PMV BLD AUTO: 8.9 FL (ref 6–12)
POTASSIUM SERPL-SCNC: 5 MMOL/L (ref 3.5–5.2)
PROT SERPL-MCNC: 7 G/DL (ref 6–8.5)
RBC # BLD AUTO: 4.45 10*6/MM3 (ref 3.77–5.28)
SODIUM SERPL-SCNC: 139 MMOL/L (ref 136–145)
WBC NRBC COR # BLD: 8.33 10*3/MM3 (ref 3.4–10.8)

## 2022-03-29 PROCEDURE — 36415 COLL VENOUS BLD VENIPUNCTURE: CPT

## 2022-03-29 PROCEDURE — 85025 COMPLETE CBC W/AUTO DIFF WBC: CPT

## 2022-03-29 PROCEDURE — 99213 OFFICE O/P EST LOW 20 MIN: CPT | Performed by: NURSE PRACTITIONER

## 2022-03-29 PROCEDURE — 80053 COMPREHEN METABOLIC PANEL: CPT

## 2022-03-29 NOTE — PROGRESS NOTES
Mazin Steve presents to [facility].    Chief Complaint:  Anxiety (Med follow up)         History of Present Illness:  Anxiety/ Depression/mood disorder  Current medication/buspar/Oxcarbazepine   Tolerating medication Yes  Stressors: dog  one year ago, mom  suddenly in   Current symptoms: depression  Took off work last week and this week, can't work due to being so down and has not been able to admit how sad and down she is, worse in the last six months, taking her rx, buspar makes her nauseated, she did not get her labs that were ordered; denies suicide ideation     PAST MEDICAL HISTORY changes since : none     Not had covid booster yet         GYNECOLOGICAL HISTORY:    G0    No problems with menstrual cycles.    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   Respiratory: Negative for cough and shortness of breath.    Cardiovascular: Negative for chest pain, palpitations and leg swelling.   Neurological: Negative for numbness.   Psychiatric/Behavioral: Negative for suicidal ideas.          Current Outpatient Medications:   •  busPIRone (BUSPAR) 10 MG tablet, Take 1 tablet by mouth Daily., Disp: 90 tablet, Rfl: 3  •  OXcarbazepine (TRILEPTAL) 150 MG tablet, Take 2 tablets by mouth 2 (Two) Times a Day., Disp: 360 tablet, Rfl: 0    Vital Signs:   Vitals:    22 0852   BP: 128/87   BP Location: Left arm   Patient Position: Sitting   Pulse: 87   Temp: 98.1 °F  "(36.7 °C)   TempSrc: Oral   Weight: 124 kg (273 lb 3.2 oz)   Height: 163.2 cm (64.25\")         Physical Exam:  Physical Exam  Vitals reviewed.   Constitutional:       General: She is not in acute distress.     Appearance: Normal appearance.   Neck:      Vascular: No carotid bruit.   Cardiovascular:      Rate and Rhythm: Normal rate and regular rhythm.      Heart sounds: Normal heart sounds. No murmur heard.  Pulmonary:      Effort: Pulmonary effort is normal. No respiratory distress.      Breath sounds: Normal breath sounds.   Musculoskeletal:      Right lower leg: No edema.      Left lower leg: No edema.   Neurological:      Mental Status: She is alert.   Psychiatric:         Behavior: Behavior normal.      Comments: Tearful          Result Review      The following data was reviewed by: RISA Jones on 03/29/2022:    No results found for this or any previous visit.            Assessment and Plan:          Diagnoses and all orders for this visit:    1. Mixed anxiety and depressive disorder (Primary)  Assessment & Plan:  Continue current meds today, worked her in to see Jaye Rouse 4-4-22, get her CMP and CBC today     Orders:  -     Ambulatory Referral to Psychiatry        Follow Up   Return for followup pending lab results.  Patient was given instructions and counseling regarding her condition or for health maintenance advice. Please see specific information pulled into the AVS if appropriate.       "

## 2022-04-12 ENCOUNTER — OFFICE VISIT (OUTPATIENT)
Dept: FAMILY MEDICINE CLINIC | Age: 42
End: 2022-04-12

## 2022-04-12 VITALS
SYSTOLIC BLOOD PRESSURE: 125 MMHG | WEIGHT: 272 LBS | DIASTOLIC BLOOD PRESSURE: 54 MMHG | OXYGEN SATURATION: 98 % | HEART RATE: 84 BPM | BODY MASS INDEX: 46.33 KG/M2

## 2022-04-12 DIAGNOSIS — R19.09 GROIN SWELLING: ICD-10-CM

## 2022-04-12 DIAGNOSIS — K64.8 OTHER HEMORRHOIDS: ICD-10-CM

## 2022-04-12 DIAGNOSIS — R19.5 CHANGE IN STOOL: Primary | ICD-10-CM

## 2022-04-12 PROCEDURE — 99213 OFFICE O/P EST LOW 20 MIN: CPT | Performed by: NURSE PRACTITIONER

## 2022-04-12 RX ORDER — HYDROCORTISONE ACETATE 25 MG/1
25 SUPPOSITORY RECTAL 2 TIMES DAILY PRN
Qty: 12 SUPPOSITORY | Refills: 0 | Status: SHIPPED | OUTPATIENT
Start: 2022-04-12 | End: 2022-05-09

## 2022-04-12 NOTE — ASSESSMENT & PLAN NOTE
Discussed increased gas, fiber, recommend a healthy diet, try an OTC Probiotic with fiber, if not improving in the next month, let me know, may send to [facility] (she is currently on her menstrual cycle)

## 2022-04-12 NOTE — PROGRESS NOTES
Mazin Steve presents to [facility].    Chief Complaint:  knot in groin  and Hemorrhoids         History of Present Illness:  Rash/skin symptoms:   Symptoms started: 3 weeks ago   Areas effected: left groin swelling / now resolved (has had boils in the past, not now)   Associated symptoms: none   Remedies tried: none other than a bandaid     GI symptoms:   hemorrhoids  Symptoms started:one month or so   Associated symptoms: bleeding, firmer stools, on fiber and pain with BM at times /has gas and bloated (sees blood with wiping after a BM)  Treatment tried: fiber gummies daily     PAST MEDICAL HISTORY changes since 3-2022: none     Not had covid booster yet         GYNECOLOGICAL HISTORY:    G0    No problems with menstrual cycles.    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   Respiratory: Negative for cough and shortness of breath.    Cardiovascular: Negative for chest pain, palpitations and leg swelling.          Current Outpatient Medications:   •  busPIRone (BUSPAR) 10 MG tablet, Take 1 tablet by mouth Daily., Disp: 90 tablet, Rfl: 3  •  OXcarbazepine (TRILEPTAL) 150 MG tablet, Take 2 tablets by mouth 2 (Two) Times a Day., Disp: 360 tablet, Rfl: 0  •  hydrocortisone (ANUSOL-HC) 25 MG suppository, Insert 1 suppository into the rectum 2 (Two) Times a Day As Needed for Hemorrhoids., Disp: 12 suppository, Rfl: 0    Vital Signs:   Vitals:    22 1334   BP: 125/54   Pulse: 84    SpO2: 98%   Weight: 123 kg (272 lb)         Physical Exam:  Physical Exam  Vitals reviewed.   Constitutional:       General: She is not in acute distress.     Appearance: Normal appearance.   Neck:      Vascular: No carotid bruit.   Cardiovascular:      Rate and Rhythm: Normal rate and regular rhythm.      Heart sounds: Normal heart sounds. No murmur heard.  Pulmonary:      Effort: Pulmonary effort is normal. No respiratory distress.      Breath sounds: Normal breath sounds.   Abdominal:      General: Bowel sounds are normal.      Palpations: There is no mass.      Tenderness: There is no abdominal tenderness.   Musculoskeletal:      Right lower leg: No edema.      Left lower leg: No edema.   Lymphadenopathy:      Lower Body: No left inguinal adenopathy.   Skin:     Comments: Skin in left groin clear    Neurological:      Mental Status: She is alert.   Psychiatric:         Mood and Affect: Mood normal.         Behavior: Behavior normal.         Result Review      The following data was reviewed by: RISA Jones on 04/12/2022:    Results for orders placed or performed in visit on 03/29/22   Comprehensive Metabolic Panel    Specimen: Blood   Result Value Ref Range    Glucose 91 65 - 99 mg/dL    BUN 12 6 - 20 mg/dL    Creatinine 0.63 0.57 - 1.00 mg/dL    Sodium 139 136 - 145 mmol/L    Potassium 5.0 3.5 - 5.2 mmol/L    Chloride 103 98 - 107 mmol/L    CO2 26.9 22.0 - 29.0 mmol/L    Calcium 9.3 8.6 - 10.5 mg/dL    Total Protein 7.0 6.0 - 8.5 g/dL    Albumin 4.40 3.50 - 5.20 g/dL    ALT (SGPT) 14 1 - 33 U/L    AST (SGOT) 14 1 - 32 U/L    Alkaline Phosphatase 75 39 - 117 U/L    Total Bilirubin 0.2 0.0 - 1.2 mg/dL    Globulin 2.6 gm/dL    A/G Ratio 1.7 g/dL    BUN/Creatinine Ratio 19.0 7.0 - 25.0    Anion Gap 9.1 5.0 - 15.0 mmol/L    eGFR 113.0 >60.0 mL/min/1.73   CBC Auto Differential    Specimen: Blood   Result Value Ref Range    WBC 8.33 3.40 - 10.80 10*3/mm3    RBC 4.45 3.77 - 5.28 10*6/mm3    Hemoglobin  12.2 12.0 - 15.9 g/dL    Hematocrit 38.3 34.0 - 46.6 %    MCV 86.1 79.0 - 97.0 fL    MCH 27.4 26.6 - 33.0 pg    MCHC 31.9 31.5 - 35.7 g/dL    RDW 13.0 12.3 - 15.4 %    RDW-SD 41.0 37.0 - 54.0 fl    MPV 8.9 6.0 - 12.0 fL    Platelets 293 140 - 450 10*3/mm3    Neutrophil % 61.7 42.7 - 76.0 %    Lymphocyte % 27.1 19.6 - 45.3 %    Monocyte % 8.5 5.0 - 12.0 %    Eosinophil % 2.3 0.3 - 6.2 %    Basophil % 0.4 0.0 - 1.5 %    Immature Grans % 0.0 0.0 - 0.5 %    Neutrophils, Absolute 5.14 1.70 - 7.00 10*3/mm3    Lymphocytes, Absolute 2.26 0.70 - 3.10 10*3/mm3    Monocytes, Absolute 0.71 0.10 - 0.90 10*3/mm3    Eosinophils, Absolute 0.19 0.00 - 0.40 10*3/mm3    Basophils, Absolute 0.03 0.00 - 0.20 10*3/mm3    Immature Grans, Absolute 0.00 0.00 - 0.05 10*3/mm3               Assessment and Plan:          Diagnoses and all orders for this visit:    1. Change in stool (Primary)  Assessment & Plan:  Discussed increased gas, fiber, recommend a healthy diet, try an OTC Probiotic with fiber, if not improving in the next month, let me know, may send to  (she is currently on her menstrual cycle)       2. Other hemorrhoids  Assessment & Plan:  Will give her a trial of annusol, can do warm soaks    Orders:  -     hydrocortisone (ANUSOL-HC) 25 MG suppository; Insert 1 suppository into the rectum 2 (Two) Times a Day As Needed for Hemorrhoids.  Dispense: 12 suppository; Refill: 0    3. Groin swelling  Assessment & Plan:  Resolved, reassured, if symptoms return, follow up           Follow Up   Return if symptoms worsen or fail to improve.  Patient was given instructions and counseling regarding her condition or for health maintenance advice. Please see specific information pulled into the AVS if appropriate.

## 2022-05-09 ENCOUNTER — OFFICE VISIT (OUTPATIENT)
Dept: BEHAVIORAL HEALTH | Facility: CLINIC | Age: 42
End: 2022-05-09

## 2022-05-09 VITALS
OXYGEN SATURATION: 98 % | SYSTOLIC BLOOD PRESSURE: 132 MMHG | BODY MASS INDEX: 46.33 KG/M2 | HEART RATE: 80 BPM | WEIGHT: 271.4 LBS | HEIGHT: 64 IN | DIASTOLIC BLOOD PRESSURE: 86 MMHG

## 2022-05-09 DIAGNOSIS — Z62.810 HISTORY OF PHYSICAL ABUSE IN CHILDHOOD: ICD-10-CM

## 2022-05-09 DIAGNOSIS — F33.2 SEVERE EPISODE OF RECURRENT MAJOR DEPRESSIVE DISORDER, WITHOUT PSYCHOTIC FEATURES: ICD-10-CM

## 2022-05-09 DIAGNOSIS — F41.1 GENERALIZED ANXIETY DISORDER: ICD-10-CM

## 2022-05-09 DIAGNOSIS — F39 UNSPECIFIED MOOD (AFFECTIVE) DISORDER: ICD-10-CM

## 2022-05-09 PROCEDURE — 90792 PSYCH DIAG EVAL W/MED SRVCS: CPT | Performed by: NURSE PRACTITIONER

## 2022-05-09 RX ORDER — OXCARBAZEPINE 150 MG/1
TABLET, FILM COATED ORAL
Qty: 42 TABLET | Refills: 0
Start: 2022-05-09 | End: 2022-05-23 | Stop reason: DRUGHIGH
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
## 2022-05-09 NOTE — PATIENT INSTRUCTIONS
1.  Please return to clinic at your next scheduled visit.  Contact the clinic ([phone]) at least 24 hours prior in the event you need to cancel.  2.  Do no harm to yourself or others.    3.  Avoid alcohol and drugs.    4.  Take all medications as prescribed.  Please contact the clinic with any concerns. If you are in need of medication refills, please call the clinic at [phone].    5. Should you want to get in touch with your provider, RISA Chaves, please utilize [employer] for iPhone and Android or contact the office ([phone]), and staff will be able to page the provider on call directly.  6.  In the event you have personal crisis, contact the following crisis numbers: Suicide Prevention Hotline 1-828.453.8879; [facility] Helpline 0-992-998-OLPX; [facility] Emergency Room [phone]; text HELLO to 313705; or 430.  7.  Please feel free to contact my Medical Assistant, Lizeth, directly at [phone], please leave a voice mail if you do not get an answer, and she will return your call within 24 hrs.      SPECIFIC RECOMMENDATIONS:     1.      Medications discussed at this encounter:                   - increase Trileptal (Oxcarbazepine) from 150 mg by mouth nightly to 300 mg (2 of the 150 mg) nightly for 7 days, then increase to 300 mg twice daily for 7 days, then I will see you back in office in 2 weeks     Try taking one half tablet of Buspar 10 mg to equal 5 mg nightly to determine if you still feel nauseated or dizzy.  Try to take the 10 mg by itself tonight then take the Trileptal 30 minutes later to determine if the Buspar is making you feel this way or if it is the Trileptal.    2.      Psychotherapy recommendations: [facility]erra; to [facility] with Viviana Grant LCSW. Office will call to schedule appointment.       3.     Return to clinic: 2 weeks

## 2022-05-13 ENCOUNTER — TELEPHONE (OUTPATIENT)
Dept: PSYCHIATRY | Facility: CLINIC | Age: 42
End: 2022-05-13

## 2022-05-13 NOTE — TELEPHONE ENCOUNTER
Patient called stating she does not want to schedule with or therapist, she don't want to see a therapist, sending encounter to provider and closing out referral as pt refused.

## 2022-05-23 ENCOUNTER — OFFICE VISIT (OUTPATIENT)
Dept: BEHAVIORAL HEALTH | Facility: CLINIC | Age: 42
End: 2022-05-23

## 2022-05-23 VITALS
WEIGHT: 269 LBS | DIASTOLIC BLOOD PRESSURE: 74 MMHG | HEART RATE: 84 BPM | OXYGEN SATURATION: 98 % | BODY MASS INDEX: 45.93 KG/M2 | SYSTOLIC BLOOD PRESSURE: 106 MMHG | HEIGHT: 64 IN

## 2022-05-23 DIAGNOSIS — F39 UNSPECIFIED MOOD (AFFECTIVE) DISORDER: ICD-10-CM

## 2022-05-23 DIAGNOSIS — F41.1 GENERALIZED ANXIETY DISORDER: ICD-10-CM

## 2022-05-23 DIAGNOSIS — F33.2 SEVERE EPISODE OF RECURRENT MAJOR DEPRESSIVE DISORDER, WITHOUT PSYCHOTIC FEATURES: Primary | ICD-10-CM

## 2022-05-23 PROCEDURE — 99213 OFFICE O/P EST LOW 20 MIN: CPT | Performed by: NURSE PRACTITIONER

## 2022-05-23 RX ORDER — OXCARBAZEPINE 300 MG/1
300 TABLET, FILM COATED ORAL NIGHTLY
Qty: 30 TABLET | Refills: 1 | Status: SHIPPED | OUTPATIENT
Start: 2022-05-23 | End: 2023-02-09 | Stop reason: DRUGHIGH

## 2022-05-23 NOTE — PATIENT INSTRUCTIONS
"1.  Please return to clinic at your next scheduled visit.  Contact the clinic ([phone]) at least 24 hours prior in the event you need to cancel.  2.  Do no harm to yourself or others.    3.  Avoid alcohol and drugs.    4.  Take all medications as prescribed.  Please contact the clinic with any concerns. If you are in need of medication refills, please call the clinic at [phone].    5. Should you want to get in touch with your provider, RISA Chaves, please utilize [employer] or contact the office ([phone]), and staff will be able to page the provider on call directly.  6.  In the event you have personal crisis, contact the following crisis numbers: Suicide Prevention Hotline 1-536.981.8861; [facility] Helpline 2-862-564-HNDH; [facility] Emergency Room [phone]; text HELLO to 867448; or 971.  7.  Please feel free to contact my Medical Assistant, Lizeth, directly at [phone], please leave a voice mail if you do not get an answer, and she will return your call within 24 hrs.      SPECIFIC RECOMMENDATIONS:     1.      Medications discussed at this encounter:                   - Continue Trileptal (Oxcarbazepine) 300 mg nightly I ordered 300 mg tablet, once youi  the 300 mg you will only need to take 1 tablet nightly instead of 2 of the 150 mg you are currently taking.  Continue Buspar 10 mg nightly      2.      Psychotherapy recommendations: n/a     3.     Return to clinic: 4 weeks    [facility]  Patient Safety Plan       Patient Name: Mazin Steve       YOB: 1980    Step 1: Warning Signs (thoughts, images, mood, situation, behavior) that a crisis may be developin.  Thoughts \" I would be better off dead\" or \"If I did this, then I would be better off\"; Talking about wanting to die or to kill themselves, talking about feeling hopeless or having no reason to live, talking about feeling trapped or in unbearable pain, talking about being a " "burden to others, increasing the use of alcohol or drugs, acting anxious or agitated; behaving recklessly, sleeping too little or too much, withdrawing or isolating themselves, extreme mood swings.  2.  Behaviors, isolating, more arguments  3.  Worsening mood, sadness, fatigue    Step 2: Internal coping strategies-things I can do to take my mind off my problems without contacting another person (relaxation technique, physical activity):     mindfulness (deep breathing, progressive muscle relaxation, imagery, grounding & meditation)   cognitive behavioral strategies (reviewing cognitive distortions, negative self-talk/thought stopping and cognitive restructuring, through de-catastrophizing and examining options/alternatives)   Walk/hike, listen to TV, number puzzles, house cleaning, chores, play with pets, engage in a hobby like gardening,  read a book, journal.     Step 3: People and social settings that provide distraction:     Name: [facility]   Place: [address]          Step 4: People who I can ask for help:     Name: [employer]           Step 5: Professionals or agencies I can contact during a crisis:    Clinician Name:  RISA Chaves     Phone: [phone] or [phone]  or [phone]        Clinician Pager or Emergency Contact #     2.   Clinician Name: PCP or therapist      Phone:        Clinician Pager or Emergency Contact #     3. Local Emergency Care Services: [facility] Emergency Department      Emergency Care Services Address: [address] [place] [address]      Emergency Care Services Phones: [phone]    4. 24/7 Suicide Prevention Lifeline Phone: [phone]    5. 24/7 Crisis Text Line Counseling: Text 'HOME' to 639285    Making the Environment Safe:     Pills - give to pharmacist or friend for disposal      The one thing that is most important to me and worth living for is: \"I don't know\"      "
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
## 2022-07-13 ENCOUNTER — TELEPHONE (OUTPATIENT)
Dept: FAMILY MEDICINE CLINIC | Age: 42
End: 2022-07-13

## 2022-07-13 DIAGNOSIS — R19.8 ABNORMAL BOWEL MOVEMENT: Primary | ICD-10-CM

## 2022-07-13 NOTE — TELEPHONE ENCOUNTER
Caller: Mazin Steve    Relationship to patient: Self    Best call back number: [phone] OKAY TO LEAVE MESSAGE ON PHONE    Patient is needing: PATIENT CALLED IN AND SAID SHE IS STILL HAVING ISSUES WITH HER BOWEL MOVEMENTS AND HAS TRIED MANY FIBER SUPPLEMENTS. PATIENT ASKED TO HAVE A REFERRAL TO A GASTROENTEROLOGIST THAT IS A FEMALE AND PREFERABLY IN [place] TO AVOID ANY  CONFLICT WITH HER WORK. PATIENT REQUESTS A CALL BACK PLEASE.

## 2022-07-14 ENCOUNTER — LAB (OUTPATIENT)
Dept: LAB | Facility: HOSPITAL | Age: 42
End: 2022-07-14

## 2022-07-14 ENCOUNTER — OFFICE VISIT (OUTPATIENT)
Dept: FAMILY MEDICINE CLINIC | Age: 42
End: 2022-07-14

## 2022-07-14 VITALS
WEIGHT: 259.4 LBS | TEMPERATURE: 97.7 F | HEIGHT: 64 IN | DIASTOLIC BLOOD PRESSURE: 74 MMHG | BODY MASS INDEX: 44.28 KG/M2 | HEART RATE: 77 BPM | SYSTOLIC BLOOD PRESSURE: 133 MMHG

## 2022-07-14 DIAGNOSIS — K64.8 OTHER HEMORRHOIDS: ICD-10-CM

## 2022-07-14 DIAGNOSIS — R53.83 FATIGUE, UNSPECIFIED TYPE: Primary | ICD-10-CM

## 2022-07-14 DIAGNOSIS — R53.83 FATIGUE, UNSPECIFIED TYPE: ICD-10-CM

## 2022-07-14 LAB
25(OH)D3 SERPL-MCNC: 29.8 NG/ML (ref 30–100)
ALBUMIN SERPL-MCNC: 4 G/DL (ref 3.5–5.2)
ALBUMIN/GLOB SERPL: 1.5 G/DL
ALP SERPL-CCNC: 84 U/L (ref 39–117)
ALT SERPL W P-5'-P-CCNC: 9 U/L (ref 1–33)
ANION GAP SERPL CALCULATED.3IONS-SCNC: 8.9 MMOL/L (ref 5–15)
AST SERPL-CCNC: 13 U/L (ref 1–32)
BASOPHILS # BLD AUTO: 0.03 10*3/MM3 (ref 0–0.2)
BASOPHILS NFR BLD AUTO: 0.3 % (ref 0–1.5)
BILIRUB SERPL-MCNC: 0.4 MG/DL (ref 0–1.2)
BUN SERPL-MCNC: 5 MG/DL (ref 6–20)
BUN/CREAT SERPL: 7.6 (ref 7–25)
CALCIUM SPEC-SCNC: 8.8 MG/DL (ref 8.6–10.5)
CHLORIDE SERPL-SCNC: 104 MMOL/L (ref 98–107)
CO2 SERPL-SCNC: 27.1 MMOL/L (ref 22–29)
CREAT SERPL-MCNC: 0.66 MG/DL (ref 0.57–1)
DEPRECATED RDW RBC AUTO: 41.6 FL (ref 37–54)
EGFRCR SERPLBLD CKD-EPI 2021: 111.8 ML/MIN/1.73
EOSINOPHIL # BLD AUTO: 0.16 10*3/MM3 (ref 0–0.4)
EOSINOPHIL NFR BLD AUTO: 1.6 % (ref 0.3–6.2)
ERYTHROCYTE [DISTWIDTH] IN BLOOD BY AUTOMATED COUNT: 13.1 % (ref 12.3–15.4)
GLOBULIN UR ELPH-MCNC: 2.7 GM/DL
GLUCOSE SERPL-MCNC: 124 MG/DL (ref 65–99)
HCT VFR BLD AUTO: 37.1 % (ref 34–46.6)
HGB BLD-MCNC: 11.9 G/DL (ref 12–15.9)
IMM GRANULOCYTES # BLD AUTO: 0.02 10*3/MM3 (ref 0–0.05)
IMM GRANULOCYTES NFR BLD AUTO: 0.2 % (ref 0–0.5)
LYMPHOCYTES # BLD AUTO: 1.94 10*3/MM3 (ref 0.7–3.1)
LYMPHOCYTES NFR BLD AUTO: 20 % (ref 19.6–45.3)
MCH RBC QN AUTO: 27.4 PG (ref 26.6–33)
MCHC RBC AUTO-ENTMCNC: 32.1 G/DL (ref 31.5–35.7)
MCV RBC AUTO: 85.3 FL (ref 79–97)
MONOCYTES # BLD AUTO: 0.75 10*3/MM3 (ref 0.1–0.9)
MONOCYTES NFR BLD AUTO: 7.7 % (ref 5–12)
NEUTROPHILS NFR BLD AUTO: 6.82 10*3/MM3 (ref 1.7–7)
NEUTROPHILS NFR BLD AUTO: 70.2 % (ref 42.7–76)
PLATELET # BLD AUTO: 301 10*3/MM3 (ref 140–450)
PMV BLD AUTO: 8.2 FL (ref 6–12)
POTASSIUM SERPL-SCNC: 4.4 MMOL/L (ref 3.5–5.2)
PROT SERPL-MCNC: 6.7 G/DL (ref 6–8.5)
RBC # BLD AUTO: 4.35 10*6/MM3 (ref 3.77–5.28)
SODIUM SERPL-SCNC: 140 MMOL/L (ref 136–145)
VIT B12 BLD-MCNC: 522 PG/ML (ref 211–946)
WBC NRBC COR # BLD: 9.72 10*3/MM3 (ref 3.4–10.8)

## 2022-07-14 PROCEDURE — 82607 VITAMIN B-12: CPT

## 2022-07-14 PROCEDURE — 82306 VITAMIN D 25 HYDROXY: CPT

## 2022-07-14 PROCEDURE — 85025 COMPLETE CBC W/AUTO DIFF WBC: CPT

## 2022-07-14 PROCEDURE — 36415 COLL VENOUS BLD VENIPUNCTURE: CPT

## 2022-07-14 PROCEDURE — 99213 OFFICE O/P EST LOW 20 MIN: CPT | Performed by: NURSE PRACTITIONER

## 2022-07-14 PROCEDURE — 80053 COMPREHEN METABOLIC PANEL: CPT

## 2022-07-14 NOTE — PROGRESS NOTES
"Mazin Steve presents to [facility].    Chief Complaint:  Fatigue (X 1 day. ) and Bloated (X 1 day; loss of appetite )         History of Present Illness:  Fatigue  Symptoms started: yesterday  Associated symptoms: bloating, loss of appetite, weakness   treatment tried: mylanta  Here in April with hemorrhoid issues: better, on benfiber, did not try the anusol, occ hemorrhoids bleed   LMP 22    PAST MEDICAL HISTORY changes since :       Not had covid booster yet         GYNECOLOGICAL HISTORY:    G0    No problems with menstrual cycles.    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   Respiratory: Negative for cough and shortness of breath.    Cardiovascular: Negative for chest pain, palpitations and leg swelling.   Gastrointestinal: Positive for nausea (last night ). Negative for GERD.   Genitourinary: Negative for dysuria.   Neurological: Negative for numbness.          Current Outpatient Medications:   •  busPIRone (BUSPAR) 10 MG tablet, Take 1 tablet by mouth Daily., Disp: 90 tablet, Rfl: 3  •  OXcarbazepine (TRILEPTAL) 300 MG tablet, Take 1 tablet by mouth Every Night, Disp: 30 tablet, Rfl: 1    Vital Signs:   Vitals:    22 1327   BP: 133/74   BP Location: Right arm   Patient Position: Sitting   Pulse: 77   Temp: 97.7 °F (36.5 °C)   TempSrc: Oral   Weight: 118 kg (259 lb 6.4 oz)   Height: 163.2 cm (64.25\")         Physical Exam:  Physical Exam  Vitals reviewed.   Constitutional:       General: She is not in " acute distress.     Appearance: Normal appearance.   Neck:      Vascular: No carotid bruit.   Cardiovascular:      Rate and Rhythm: Normal rate and regular rhythm.      Heart sounds: Normal heart sounds. No murmur heard.  Pulmonary:      Effort: Pulmonary effort is normal. No respiratory distress.      Breath sounds: Normal breath sounds.   Abdominal:      Palpations: Abdomen is soft. There is no mass.      Tenderness: There is no abdominal tenderness.   Musculoskeletal:      Right lower leg: No edema.      Left lower leg: No edema.   Neurological:      Mental Status: She is alert.   Psychiatric:         Mood and Affect: Mood normal.         Behavior: Behavior normal.         Result Review      The following data was reviewed by: RISA Jones on 07/14/2022:  \plain  Results for orders placed or performed in visit on 03/29/22   Comprehensive Metabolic Panel    Specimen: Blood   Result Value Ref Range    Glucose 91 65 - 99 mg/dL    BUN 12 6 - 20 mg/dL    Creatinine 0.63 0.57 - 1.00 mg/dL    Sodium 139 136 - 145 mmol/L    Potassium 5.0 3.5 - 5.2 mmol/L    Chloride 103 98 - 107 mmol/L    CO2 26.9 22.0 - 29.0 mmol/L    Calcium 9.3 8.6 - 10.5 mg/dL    Total Protein 7.0 6.0 - 8.5 g/dL    Albumin 4.40 3.50 - 5.20 g/dL    ALT (SGPT) 14 1 - 33 U/L    AST (SGOT) 14 1 - 32 U/L    Alkaline Phosphatase 75 39 - 117 U/L    Total Bilirubin 0.2 0.0 - 1.2 mg/dL    Globulin 2.6 gm/dL    A/G Ratio 1.7 g/dL    BUN/Creatinine Ratio 19.0 7.0 - 25.0    Anion Gap 9.1 5.0 - 15.0 mmol/L    eGFR 113.0 >60.0 mL/min/1.73   CBC Auto Differential    Specimen: Blood   Result Value Ref Range    WBC 8.33 3.40 - 10.80 10*3/mm3    RBC 4.45 3.77 - 5.28 10*6/mm3    Hemoglobin 12.2 12.0 - 15.9 g/dL    Hematocrit 38.3 34.0 - 46.6 %    MCV 86.1 79.0 - 97.0 fL    MCH 27.4 26.6 - 33.0 pg    MCHC 31.9 31.5 - 35.7 g/dL    RDW 13.0 12.3 - 15.4 %    RDW-SD 41.0 37.0 - 54.0 fl    MPV 8.9 6.0 - 12.0 fL    Platelets 293 140 - 450 10*3/mm3    Neutrophil  % 61.7 42.7 - 76.0 %    Lymphocyte % 27.1 19.6 - 45.3 %    Monocyte % 8.5 5.0 - 12.0 %    Eosinophil % 2.3 0.3 - 6.2 %    Basophil % 0.4 0.0 - 1.5 %    Immature Grans % 0.0 0.0 - 0.5 %    Neutrophils, Absolute 5.14 1.70 - 7.00 10*3/mm3    Lymphocytes, Absolute 2.26 0.70 - 3.10 10*3/mm3    Monocytes, Absolute 0.71 0.10 - 0.90 10*3/mm3    Eosinophils, Absolute 0.19 0.00 - 0.40 10*3/mm3    Basophils, Absolute 0.03 0.00 - 0.20 10*3/mm3    Immature Grans, Absolute 0.00 0.00 - 0.05 10*3/mm3               Assessment and Plan:          Diagnoses and all orders for this visit:    1. Fatigue, unspecified type (Primary)  Assessment & Plan:  Checking some labs     Orders:  -     CBC w AUTO Differential; Future  -     Comprehensive metabolic panel; Future  -     Vitamin D 25 hydroxy; Future  -     Vitamin B12; Future    2. Other hemorrhoids  Assessment & Plan:  After labs will set her up with female GE at [facility]           Follow Up   Return for followup pending lab results.  Patient was given instructions and counseling regarding her condition or for health maintenance advice. Please see specific information pulled into the AVS if appropriate.

## 2022-07-18 ENCOUNTER — TELEPHONE (OUTPATIENT)
Dept: FAMILY MEDICINE CLINIC | Age: 42
End: 2022-07-18

## 2022-07-18 NOTE — TELEPHONE ENCOUNTER
Caller: Mazin Steve    Relationship to patient: Self    Best call back number: [phone]     Patient is needing: PATIENT CALLED IN AND SAID SHE WAS SEEN ON Thursday AND ON Friday FELT THE SAME AND WAS BLOATED AND COULD ONLY EAT CRACKERS. SHE SAID OVER THE WEEKEND SHE COULD BARELY EAT ANYTHING AND ONLY BITES. SHE HAS BEEN HAVING DIARRHEA FOR 4 DAYS AND REQUESTS A CALL BACK PLEASE.        [facility] - [facility]  [phone]

## 2022-07-19 ENCOUNTER — APPOINTMENT (OUTPATIENT)
Dept: CT IMAGING | Facility: HOSPITAL | Age: 42
End: 2022-07-19

## 2022-07-19 ENCOUNTER — HOSPITAL ENCOUNTER (EMERGENCY)
Facility: HOSPITAL | Age: 42
Discharge: HOME | End: 2022-07-19
Attending: EMERGENCY MEDICINE | Admitting: EMERGENCY MEDICINE

## 2022-07-19 VITALS
SYSTOLIC BLOOD PRESSURE: 119 MMHG | HEART RATE: 86 BPM | OXYGEN SATURATION: 100 % | RESPIRATION RATE: 14 BRPM | HEIGHT: 64 IN | WEIGHT: 260 LBS | TEMPERATURE: 97.8 F | BODY MASS INDEX: 44.39 KG/M2 | DIASTOLIC BLOOD PRESSURE: 67 MMHG

## 2022-07-19 DIAGNOSIS — K80.20 CALCULUS OF GALLBLADDER WITHOUT CHOLECYSTITIS WITHOUT OBSTRUCTION: ICD-10-CM

## 2022-07-19 DIAGNOSIS — R10.9 ACUTE ABDOMINAL PAIN: Primary | ICD-10-CM

## 2022-07-19 DIAGNOSIS — K20.90 ESOPHAGITIS: ICD-10-CM

## 2022-07-19 DIAGNOSIS — R68.81 EARLY SATIETY: ICD-10-CM

## 2022-07-19 DIAGNOSIS — K44.9 HIATAL HERNIA: ICD-10-CM

## 2022-07-19 LAB
ALBUMIN SERPL-MCNC: 4.4 G/DL (ref 3.5–5.2)
ALBUMIN/GLOB SERPL: 1.6 G/DL
ALP SERPL-CCNC: 82 U/L (ref 39–117)
ALT SERPL W P-5'-P-CCNC: 11 U/L (ref 1–33)
ANION GAP SERPL CALCULATED.3IONS-SCNC: 10 MMOL/L (ref 5–15)
AST SERPL-CCNC: 15 U/L (ref 1–32)
B-HCG UR QL: NEGATIVE
BACTERIA UR QL AUTO: ABNORMAL /HPF
BASOPHILS # BLD AUTO: 0.03 10*3/MM3 (ref 0–0.2)
BASOPHILS NFR BLD AUTO: 0.4 % (ref 0–1.5)
BILIRUB SERPL-MCNC: 0.4 MG/DL (ref 0–1.2)
BILIRUB UR QL STRIP: NEGATIVE
BUN SERPL-MCNC: 7 MG/DL (ref 6–20)
BUN/CREAT SERPL: 9.9 (ref 7–25)
CALCIUM SPEC-SCNC: 8.8 MG/DL (ref 8.6–10.5)
CHLORIDE SERPL-SCNC: 103 MMOL/L (ref 98–107)
CLARITY UR: CLEAR
CO2 SERPL-SCNC: 25 MMOL/L (ref 22–29)
COLOR UR: YELLOW
CREAT SERPL-MCNC: 0.71 MG/DL (ref 0.57–1)
DEPRECATED RDW RBC AUTO: 41.2 FL (ref 37–54)
EGFRCR SERPLBLD CKD-EPI 2021: 108.3 ML/MIN/1.73
EOSINOPHIL # BLD AUTO: 0.11 10*3/MM3 (ref 0–0.4)
EOSINOPHIL NFR BLD AUTO: 1.5 % (ref 0.3–6.2)
ERYTHROCYTE [DISTWIDTH] IN BLOOD BY AUTOMATED COUNT: 13.4 % (ref 12.3–15.4)
GLOBULIN UR ELPH-MCNC: 2.7 GM/DL
GLUCOSE SERPL-MCNC: 94 MG/DL (ref 65–99)
GLUCOSE UR STRIP-MCNC: NEGATIVE MG/DL
HCT VFR BLD AUTO: 37.9 % (ref 34–46.6)
HGB BLD-MCNC: 12.4 G/DL (ref 12–15.9)
HGB UR QL STRIP.AUTO: ABNORMAL
HOLD SPECIMEN: NORMAL
HOLD SPECIMEN: NORMAL
HYALINE CASTS UR QL AUTO: ABNORMAL /LPF
IMM GRANULOCYTES # BLD AUTO: 0.02 10*3/MM3 (ref 0–0.05)
IMM GRANULOCYTES NFR BLD AUTO: 0.3 % (ref 0–0.5)
KETONES UR QL STRIP: ABNORMAL
LEUKOCYTE ESTERASE UR QL STRIP.AUTO: ABNORMAL
LIPASE SERPL-CCNC: 25 U/L (ref 13–60)
LYMPHOCYTES # BLD AUTO: 1.97 10*3/MM3 (ref 0.7–3.1)
LYMPHOCYTES NFR BLD AUTO: 26.1 % (ref 19.6–45.3)
MCH RBC QN AUTO: 27.7 PG (ref 26.6–33)
MCHC RBC AUTO-ENTMCNC: 32.7 G/DL (ref 31.5–35.7)
MCV RBC AUTO: 84.8 FL (ref 79–97)
MONOCYTES # BLD AUTO: 0.55 10*3/MM3 (ref 0.1–0.9)
MONOCYTES NFR BLD AUTO: 7.3 % (ref 5–12)
NEUTROPHILS NFR BLD AUTO: 4.88 10*3/MM3 (ref 1.7–7)
NEUTROPHILS NFR BLD AUTO: 64.4 % (ref 42.7–76)
NITRITE UR QL STRIP: NEGATIVE
NRBC BLD AUTO-RTO: 0 /100 WBC (ref 0–0.2)
PH UR STRIP.AUTO: 7 [PH] (ref 5–8)
PLATELET # BLD AUTO: 317 10*3/MM3 (ref 140–450)
PMV BLD AUTO: 8.8 FL (ref 6–12)
POTASSIUM SERPL-SCNC: 3.8 MMOL/L (ref 3.5–5.2)
PROT SERPL-MCNC: 7.1 G/DL (ref 6–8.5)
PROT UR QL STRIP: NEGATIVE
RBC # BLD AUTO: 4.47 10*6/MM3 (ref 3.77–5.28)
RBC # UR STRIP: ABNORMAL /HPF
REF LAB TEST METHOD: ABNORMAL
SODIUM SERPL-SCNC: 138 MMOL/L (ref 136–145)
SP GR UR STRIP: <1.005 (ref 1–1.03)
SQUAMOUS #/AREA URNS HPF: ABNORMAL /HPF
UROBILINOGEN UR QL STRIP: ABNORMAL
WBC # UR STRIP: ABNORMAL /HPF
WBC NRBC COR # BLD: 7.56 10*3/MM3 (ref 3.4–10.8)
WHOLE BLOOD HOLD COAG: NORMAL
WHOLE BLOOD HOLD SPECIMEN: NORMAL
YEAST URNS QL MICRO: ABNORMAL /HPF

## 2022-07-19 PROCEDURE — 99283 EMERGENCY DEPT VISIT LOW MDM: CPT

## 2022-07-19 PROCEDURE — 85025 COMPLETE CBC W/AUTO DIFF WBC: CPT

## 2022-07-19 PROCEDURE — 74176 CT ABD & PELVIS W/O CONTRAST: CPT

## 2022-07-19 PROCEDURE — 80053 COMPREHEN METABOLIC PANEL: CPT

## 2022-07-19 PROCEDURE — 81001 URINALYSIS AUTO W/SCOPE: CPT

## 2022-07-19 PROCEDURE — 81025 URINE PREGNANCY TEST: CPT | Performed by: EMERGENCY MEDICINE

## 2022-07-19 PROCEDURE — 83690 ASSAY OF LIPASE: CPT

## 2022-07-19 RX ORDER — FAMOTIDINE 20 MG/1
20 TABLET, FILM COATED ORAL 2 TIMES DAILY
Qty: 60 TABLET | Refills: 0 | Status: SHIPPED | OUTPATIENT
Start: 2022-07-19 | End: 2022-07-25 | Stop reason: SDDI

## 2022-07-19 RX ORDER — SODIUM CHLORIDE 0.9 % (FLUSH) 0.9 %
10 SYRINGE (ML) INJECTION AS NEEDED
Status: DISCONTINUED | OUTPATIENT
Start: 2022-07-19 | End: 2022-07-19 | Stop reason: HOSPADM

## 2022-07-19 NOTE — ED PROVIDER NOTES
EMERGENCY DEPARTMENT ENCOUNTER    Room Number:  09/09  Date of encounter:  7/19/2022  PCP: Mackenzie Hui APRN  Historian: Patient    Patient was placed in face mask during triage process. Patient was wearing facemask when I entered the room and throughout our encounter. I wore full protective equipment throughout this patient encounter including a face mask, eye protection, and gloves. Hand hygiene was performed before donning protective equipment and again following doffing of PPE after leaving the room.    HPI:  Chief Complaint: Early satiety with abdominal fullness  A complete HPI/ROS/PMH/PSH/SH/FH are unobtainable due to: N/A   Context: Mazin Steve is a 42 y.o. female who presents to the ED c/o waxing and waning abdominal discomfort with early satiety over the last several days.  Symptoms acutely worse in the last 5 days.  Some nausea but no vomiting or diarrhea.  Loose, nonmelanotic, foul-smelling stools have been noted since onset.  Tolerating some fluids but even this bothers her stomach.  Some discomfort left upper quadrant with eating.  No dysuria or hematuria, chest pain or shortness of breath.  Symptoms moderate at this time.  Patient does note over the last several months she has been changing her diet with goal of improving her health.  He has caused a fair amount of constipation for which she is taking in copious fiber.  No prior surgical history of the abdomen reported.      MEDICAL HISTORY REVIEW  EMR reviewed:    PAST MEDICAL HISTORY  Active Ambulatory Problems     Diagnosis Date Noted   • Cervical radiculopathy at C6 06/22/2016   • Left elbow tendinitis 06/22/2016   • Cervical spine pain 08/17/2016   • Encounter for screening mammogram for malignant neoplasm of breast 11/17/2021   • Mixed anxiety and depressive disorder    • Change in stool 04/12/2022   • Other hemorrhoids 04/12/2022   • Groin swelling 04/12/2022   • Fatigue 07/14/2022     Resolved Ambulatory Problems     Diagnosis Date  Noted   • No Resolved Ambulatory Problems     Past Medical History:   Diagnosis Date   • Anxiety    • Depression    • Joint pain    • Numbness    • Self-injurious behavior          PAST SURGICAL HISTORY  No past surgical history on file.      FAMILY HISTORY  Family History   Problem Relation Age of Onset   • Depression Mother    • Anxiety disorder Mother    • Seizures Father    • Depression Father    • Alcohol abuse Maternal Grandfather          SOCIAL HISTORY  Social History     Socioeconomic History   • Marital status: Single   Tobacco Use   • Smoking status: Never Smoker   • Smokeless tobacco: Never Used   Vaping Use   • Vaping Use: Never used   Substance and Sexual Activity   • Alcohol use: Not Currently   • Drug use: Never   • Sexual activity: Not Currently         ALLERGIES  Patient has no known allergies.        REVIEW OF SYSTEMS  Review of Systems     All systems reviewed and negative except for those discussed in HPI.       PHYSICAL EXAM    I have reviewed the triage vital signs and nursing notes.    ED Triage Vitals [07/19/22 0605]   Temp Heart Rate Resp BP SpO2   97.8 °F (36.6 °C) 86 14 -- 100 %      Temp src Heart Rate Source Patient Position BP Location FiO2 (%)   Tympanic Monitor -- -- --       Physical Exam    Physical Exam   Constitutional: No distress.   HENT:  Head: Normocephalic and atraumatic.   Oropharynx: Mucous membranes are moist.   Eyes: No scleral icterus. No conjunctival pallor.  Neck: Painless range of motion noted. Neck supple.   Cardiovascular: Pink warm and well perfused throughout.  Pulmonary/Chest: No respiratory distress.  No tachypnea or increased work of breathing.    Abdominal: Soft. There is no tenderness. There is no rebound and no guarding.  Negative Mejia's.  Benign exam.  Musculoskeletal: Moves all extremities equally.   Neurological: Alert.  Baseline strength and sensation noted.   Skin: Skin is pink, warm, and dry. No pallor.   Psychiatric: Mood and affect normal.    Nursing note and vitals reviewed.    LAB RESULTS  Recent Results (from the past 24 hour(s))   Comprehensive Metabolic Panel    Collection Time: 07/19/22  6:37 AM    Specimen: Blood   Result Value Ref Range    Glucose 94 65 - 99 mg/dL    BUN 7 6 - 20 mg/dL    Creatinine 0.71 0.57 - 1.00 mg/dL    Sodium 138 136 - 145 mmol/L    Potassium 3.8 3.5 - 5.2 mmol/L    Chloride 103 98 - 107 mmol/L    CO2 25.0 22.0 - 29.0 mmol/L    Calcium 8.8 8.6 - 10.5 mg/dL    Total Protein 7.1 6.0 - 8.5 g/dL    Albumin 4.40 3.50 - 5.20 g/dL    ALT (SGPT) 11 1 - 33 U/L    AST (SGOT) 15 1 - 32 U/L    Alkaline Phosphatase 82 39 - 117 U/L    Total Bilirubin 0.4 0.0 - 1.2 mg/dL    Globulin 2.7 gm/dL    A/G Ratio 1.6 g/dL    BUN/Creatinine Ratio 9.9 7.0 - 25.0    Anion Gap 10.0 5.0 - 15.0 mmol/L    eGFR 108.3 >60.0 mL/min/1.73   Lipase    Collection Time: 07/19/22  6:37 AM    Specimen: Blood   Result Value Ref Range    Lipase 25 13 - 60 U/L   Green Top (Gel)    Collection Time: 07/19/22  6:37 AM   Result Value Ref Range    Extra Tube Hold for add-ons.    Lavender Top    Collection Time: 07/19/22  6:37 AM   Result Value Ref Range    Extra Tube hold for add-on    Gold Top - SST    Collection Time: 07/19/22  6:37 AM   Result Value Ref Range    Extra Tube Hold for add-ons.    Light Blue Top    Collection Time: 07/19/22  6:37 AM   Result Value Ref Range    Extra Tube Hold for add-ons.    CBC Auto Differential    Collection Time: 07/19/22  6:37 AM    Specimen: Blood   Result Value Ref Range    WBC 7.56 3.40 - 10.80 10*3/mm3    RBC 4.47 3.77 - 5.28 10*6/mm3    Hemoglobin 12.4 12.0 - 15.9 g/dL    Hematocrit 37.9 34.0 - 46.6 %    MCV 84.8 79.0 - 97.0 fL    MCH 27.7 26.6 - 33.0 pg    MCHC 32.7 31.5 - 35.7 g/dL    RDW 13.4 12.3 - 15.4 %    RDW-SD 41.2 37.0 - 54.0 fl    MPV 8.8 6.0 - 12.0 fL    Platelets 317 140 - 450 10*3/mm3    Neutrophil % 64.4 42.7 - 76.0 %    Lymphocyte % 26.1 19.6 - 45.3 %    Monocyte % 7.3 5.0 - 12.0 %    Eosinophil % 1.5 0.3 - 6.2  %    Basophil % 0.4 0.0 - 1.5 %    Immature Grans % 0.3 0.0 - 0.5 %    Neutrophils, Absolute 4.88 1.70 - 7.00 10*3/mm3    Lymphocytes, Absolute 1.97 0.70 - 3.10 10*3/mm3    Monocytes, Absolute 0.55 0.10 - 0.90 10*3/mm3    Eosinophils, Absolute 0.11 0.00 - 0.40 10*3/mm3    Basophils, Absolute 0.03 0.00 - 0.20 10*3/mm3    Immature Grans, Absolute 0.02 0.00 - 0.05 10*3/mm3    nRBC 0.0 0.0 - 0.2 /100 WBC   Urinalysis With Microscopic If Indicated (No Culture) - Urine, Clean Catch    Collection Time: 07/19/22  6:46 AM    Specimen: Urine, Clean Catch   Result Value Ref Range    Color, UA Yellow Yellow, Straw    Appearance, UA Clear Clear    pH, UA 7.0 5.0 - 8.0    Specific Gravity, UA <1.005 (L) 1.005 - 1.030    Glucose, UA Negative Negative    Ketones, UA 15 mg/dL (1+) (A) Negative    Bilirubin, UA Negative Negative    Blood, UA Moderate (2+) (A) Negative    Protein, UA Negative Negative    Leuk Esterase, UA Small (1+) (A) Negative    Nitrite, UA Negative Negative    Urobilinogen, UA 0.2 E.U./dL 0.2 - 1.0 E.U./dL   Urinalysis, Microscopic Only - Urine, Clean Catch    Collection Time: 07/19/22  6:46 AM    Specimen: Urine, Clean Catch   Result Value Ref Range    RBC, UA 0-2 None Seen, 0-2 /HPF    WBC, UA 3-5 (A) None Seen, 0-2 /HPF    Bacteria, UA None Seen None Seen /HPF    Squamous Epithelial Cells, UA 3-6 (A) None Seen, 0-2 /HPF    Yeast, UA Small/1+ Budding Yeast None Seen /HPF    Hyaline Casts, UA None Seen None Seen /LPF    Methodology Manual Light Microscopy    Pregnancy, Urine - Urine, Clean Catch    Collection Time: 07/19/22  6:46 AM    Specimen: Urine, Clean Catch   Result Value Ref Range    HCG, Urine QL Negative Negative       Ordered the above labs and independently reviewed the results.        RADIOLOGY  CT Abdomen Pelvis Without Contrast    Result Date: 7/19/2022  CT ABDOMEN AND PELVIS WITHOUT IV CONTRAST  HISTORY: Abdominal fullness of the satiety.  TECHNIQUE: Radiation dose reduction techniques were  utilized, including automated exposure control and exposure modulation based on body size. 3 mm images were obtained through the abdomen and pelvis without the administration of IV contrast. Lack of IV contrast limits evaluation of solid, visceral, and vascular structures. Sensitivity for underlying lesions and infection decreased.  COMPARISON: None  FINDINGS: Artifact from motion and overlying soft tissues.  LOWER CHEST: Within normal limits.  ABDOMEN: Liver/Biliary Tract: Heterogeneous attenuation of the liver with lobular contour, please correlate with LFTs. Cholelithiasis. No gallbladder wall thickening or subjacent inflammation.  Spleen: Within normal limits.  Pancreas: Within normal limits.  Adrenals: Within normal limits.  Kidneys:  No radiopaque obstructing stones or hydroureteronephrosis.  Bowel:  Small hiatal hernia with thickening of the distal esophagus which can better be evaluated with upper GI or endoscopy if clinically indicated. No bowel obstruction. Normal appendix. Radiopaque density in the proximal colon favors a tablet. Redundant stool-filled colon.  Peritoneum: Within normal limits.  Vasculature:    Within normal limits.  Lymph Nodes:  Within normal limits.                             PELVIS:                                 Pelvic organs: Fluid and/or thickening in the endometrial canal likely related to phase of menstrual cycle. The bladder is incompletely distended. Ring artifact centered in the mid abdomen/pelvis.   Abdominal/Pelvic Wall: Asymmetric right breast tissue.  BONES: Multilevel degenerative changes most severe at L5-S1 with at least moderate canal stenosis and neuroforaminal narrowing which can better be evaluated with MRI if clinically indicated.      1. No acute intra-abdominal pelvic process on this noncontrast exam. 2. Cholelithiasis. 3. Asymmetric right breast tissue, please correlate with breast exam and ensure up-to-date with mammography. 4. Please see above for additional  findings/recommendations.  This report was finalized on 7/19/2022 10:31 AM by Dr. Josette Zacarias M.D.        I ordered the above noted radiological studies. Reviewed by me and discussed with radiologist.  See dictation for official radiology interpretation.      PROCEDURES    Procedures        MEDICATIONS GIVEN IN ER    Medications   sodium chloride 0.9 % flush 10 mL (has no administration in time range)         PROGRESS, DATA ANALYSIS, CONSULTS, AND MEDICAL DECISION MAKING    My differential diagnosis for abdominal pain includes but is not limited to:  Gastritis, gastroenteritis, peptic ulcer disease, GERD, esophageal perforation, acute appendicitis, mesenteric adenitis, Meckel’s diverticulum, epiploic appendagitis, diverticulitis, colon cancer, ulcerative colitis, Crohn’s disease, intussusception, small bowel obstruction, adhesions, ischemic bowel, perforated viscus, ileus, obstipation, biliary colic, cholecystitis, cholelithiasis, Cruz-Blas Jacques, hepatitis, pancreatitis, common bile duct obstruction, cholangitis, bile leak, splenic trauma, splenic rupture, splenic infarction, splenic abscess, abdominal abscess, ascites, spontaneous bacterial peritonitis, hernia, UTI, cystitis,ureterolithiasis, urinary obstruction, ovarian cyst, torsion, pregnancy, ectopic pregnancy, PID, pelvic abscess, mittelschmerz, endometriosis, AAA, myocardial infarction, pneumonia, cancer, porphyria, DKA, medications, sickle cell, viral syndrome, zoster      All labs have been independently reviewed by me.  All radiology studies have been reviewed by me and discussed with radiologist dictating the report.   EKG's independently viewed and interpreted by me.  Discussion below represents my analysis of pertinent findings related to patient's condition, differential diagnosis, treatment plan and final disposition.      ED Course as of 07/19/22 1055   Tue Jul 19, 2022   0844 Lipase: 25 [RS]   0844 Glucose: 94 [RS]   0844 BUN: 7 [RS]   0844  Creatinine: 0.71 [RS]   0844 Sodium: 138 [RS]   0844 Potassium: 3.8 [RS]   0844 AST (SGOT): 15 [RS]   0844 Total Bilirubin: 0.4 [RS]   0844 WBC: 7.56 [RS]   0844 Hemoglobin: 12.4 [RS]   0844 Bacteria, UA: None Seen [RS]   0844 Nitrite, UA: Negative [RS]   1026 HCG, Urine QL: Negative [RS]   1051 Patient's story is most consistent with biliary disease.  She does have cholelithiasis but no evidence by history, physical exam or CT to suggest choledocholithiasis or cholecystitis.  She also has a small hiatal hernia and some evidence of esophagitis.  Patient clearly well-hydrated this point based on labs.  No acute life threats to indicate need for admission.  Outpatient follow-up with PCP and general surgery will be recommended as well as initiation of famotidine and gallbladder friendly diet.  Patient expresses understanding agreement discharge planning at this time.  I did specifically review with the patient the radiologist concern of asymmetric breast tissue.  Patient tells me she follows regularly with her OB/GYN and has mammography. [RS]      ED Course User Index  [RS] Jonathon Escobar MD       AS OF 10:55 EDT VITALS:    BP - 126/71  HR - 86  TEMP - 97.8 °F (36.6 °C) (Tympanic)  O2 SATS - 100%        DIAGNOSIS  Final diagnoses:   Acute abdominal pain   Early satiety   Calculus of gallbladder without cholecystitis without obstruction   Esophagitis   Hiatal hernia         DISPOSITION  DISCHARGE    Patient discharged in stable condition.    Reviewed implications of results, diagnosis, meds, responsibility to follow up, warning signs and symptoms of possible worsening, potential complications and reasons to return to ER.    Patient/Family voiced understanding of above instructions.    Discussed plan for discharge, as there is no emergent indication for admission. Patient referred to primary care provider for regular health maintenance. Pt/family is agreeable and understands need for follow up and possible repeat  testing.  Pt is aware that discharge does not mean that nothing is wrong but it indicates no emergency is present that requires admission and they must continue care with follow-up as given below or physician of their choice.     FOLLOW-UP  Mackenzie Hui APRN  [address]  [facility] [address]  [facility] [id number] [phone]    Schedule an appointment as soon as possible for a visit in 1 week      Sarthak Cuevas Jr., MD  [address] [facility] [address]  [facility] [id number] [phone]    Schedule an appointment as soon as possible for a visit   Next available         Medication List      New Prescriptions    famotidine 20 MG tablet  Commonly known as: PEPCID  Take 1 tablet by mouth 2 (Two) Times a Day.           Where to Get Your Medications      These medications were sent to [facility] - [place] [address], [facility] [address]    Hours: Mon-Fri 8:00AM-6:00PM Phone: [phone]   · famotidine 20 MG tablet            Jonathon Escobar MD  07/19/22 9650

## 2022-07-19 NOTE — ED TRIAGE NOTES
"Pt presents to the ER from home via [facility].  C/o not feeling well, states she had a big meal last week and got full very fast causing her to have some abdominal pain and a \"very full feeling\"  She went to her PCP who she states \"was not concerned\", the next day pt attempted to eat some crackers and with just the crackers the same thing happened.  Pt states she has not been able to eat for the last 5 days.  C/o nausea when she does try to eat and abdominal pain when she does try to eat, if she is not eating there is no nausea or pain.    "

## 2022-07-25 ENCOUNTER — OFFICE VISIT (OUTPATIENT)
Dept: FAMILY MEDICINE CLINIC | Age: 42
End: 2022-07-25

## 2022-07-25 VITALS
WEIGHT: 252.2 LBS | BODY MASS INDEX: 43.29 KG/M2 | DIASTOLIC BLOOD PRESSURE: 78 MMHG | HEART RATE: 71 BPM | TEMPERATURE: 98.1 F | SYSTOLIC BLOOD PRESSURE: 132 MMHG

## 2022-07-25 DIAGNOSIS — K80.00 CALCULUS OF GALLBLADDER WITH ACUTE CHOLECYSTITIS WITHOUT OBSTRUCTION: Primary | ICD-10-CM

## 2022-07-25 PROCEDURE — 99213 OFFICE O/P EST LOW 20 MIN: CPT | Performed by: NURSE PRACTITIONER

## 2022-07-25 NOTE — ASSESSMENT & PLAN NOTE
Reviewed CT of abd pelvis, discussed gallstones, dietary measures, avoid fatty foods, reviewed recent labs, send to surgeon  She does not want anything for nausea, was advised to take pepcid, she is going to pick that up and take if needed

## 2022-07-25 NOTE — PROGRESS NOTES
Mazin Steve presents to [facility].    Chief Complaint:  Follow-up (22 - [facility] - abd pain, gallbladder. )         History of Present Illness:  GI symptoms:   Upper abd  Symptoms started: over a week   Associated symptoms: :anorexia, nausea  Treatment tried: dietary changes   CT abd 22:   IMPRESSION:  1. No acute intra-abdominal pelvic process on this noncontrast exam.  2. Cholelithiasis.  3. Asymmetric right breast tissue, please correlate with breast exam and  ensure up-to-date with mammography.    PAST MEDICAL HISTORY changes since :        Not had covid booster yet         GYNECOLOGICAL HISTORY:    G0    No problems with menstrual cycles.    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   Respiratory: Negative for cough and shortness of breath.    Cardiovascular: Negative for chest pain, palpitations and leg swelling.   Gastrointestinal: Positive for diarrhea (loose stools ).   Genitourinary:        No breast masses, last mammogram    Neurological: Negative for numbness.          Current Outpatient Medications:   •  busPIRone (BUSPAR) 10 MG tablet, Take 1 tablet by mouth Daily., Disp: 90 tablet, Rfl: 3  •  OXcarbazepine (TRILEPTAL) 300 MG tablet, Take 1 tablet by mouth Every Night, Disp: 30 tablet, Rfl: 1    Vital Signs:   Vitals:    22 1459   BP: 132/78   BP Location: Left arm   Patient Position: Sitting   Pulse: 71   Temp: 98.1 °F (36.7 °C)   TempSrc: Oral   Weight: 114 kg  (252 lb 3.2 oz)         Physical Exam:  Physical Exam  Vitals reviewed.   Constitutional:       General: She is not in acute distress.     Appearance: Normal appearance.   Neck:      Vascular: No carotid bruit.   Cardiovascular:      Rate and Rhythm: Normal rate and regular rhythm.      Heart sounds: Normal heart sounds. No murmur heard.  Pulmonary:      Effort: Pulmonary effort is normal. No respiratory distress.      Breath sounds: Normal breath sounds.   Chest:   Breasts:      Right: Normal. No mass, nipple discharge, skin change or axillary adenopathy.      Left: Normal. No mass, nipple discharge, skin change or axillary adenopathy.       Abdominal:      Palpations: Abdomen is soft.      Tenderness: There is no abdominal tenderness.   Musculoskeletal:      Right lower leg: No edema.      Left lower leg: No edema.   Lymphadenopathy:      Upper Body:      Right upper body: No axillary adenopathy.      Left upper body: No axillary adenopathy.   Neurological:      Mental Status: She is alert.   Psychiatric:         Mood and Affect: Mood normal.         Behavior: Behavior normal.         Result Review      The following data was reviewed by: RISA Jones on 07/25/2022:    Results for orders placed or performed during the hospital encounter of 07/19/22   Comprehensive Metabolic Panel    Specimen: Blood   Result Value Ref Range    Glucose 94 65 - 99 mg/dL    BUN 7 6 - 20 mg/dL    Creatinine 0.71 0.57 - 1.00 mg/dL    Sodium 138 136 - 145 mmol/L    Potassium 3.8 3.5 - 5.2 mmol/L    Chloride 103 98 - 107 mmol/L    CO2 25.0 22.0 - 29.0 mmol/L    Calcium 8.8 8.6 - 10.5 mg/dL    Total Protein 7.1 6.0 - 8.5 g/dL    Albumin 4.40 3.50 - 5.20 g/dL    ALT (SGPT) 11 1 - 33 U/L    AST (SGOT) 15 1 - 32 U/L    Alkaline Phosphatase 82 39 - 117 U/L    Total Bilirubin 0.4 0.0 - 1.2 mg/dL    Globulin 2.7 gm/dL    A/G Ratio 1.6 g/dL    BUN/Creatinine Ratio 9.9 7.0 - 25.0    Anion Gap 10.0 5.0 - 15.0 mmol/L    eGFR 108.3  >60.0 mL/min/1.73   Lipase    Specimen: Blood   Result Value Ref Range    Lipase 25 13 - 60 U/L   Urinalysis With Microscopic If Indicated (No Culture) - Urine, Clean Catch    Specimen: Urine, Clean Catch   Result Value Ref Range    Color, UA Yellow Yellow, Straw    Appearance, UA Clear Clear    pH, UA 7.0 5.0 - 8.0    Specific Gravity, UA <1.005 (L) 1.005 - 1.030    Glucose, UA Negative Negative    Ketones, UA 15 mg/dL (1+) (A) Negative    Bilirubin, UA Negative Negative    Blood, UA Moderate (2+) (A) Negative    Protein, UA Negative Negative    Leuk Esterase, UA Small (1+) (A) Negative    Nitrite, UA Negative Negative    Urobilinogen, UA 0.2 E.U./dL 0.2 - 1.0 E.U./dL   CBC Auto Differential    Specimen: Blood   Result Value Ref Range    WBC 7.56 3.40 - 10.80 10*3/mm3    RBC 4.47 3.77 - 5.28 10*6/mm3    Hemoglobin 12.4 12.0 - 15.9 g/dL    Hematocrit 37.9 34.0 - 46.6 %    MCV 84.8 79.0 - 97.0 fL    MCH 27.7 26.6 - 33.0 pg    MCHC 32.7 31.5 - 35.7 g/dL    RDW 13.4 12.3 - 15.4 %    RDW-SD 41.2 37.0 - 54.0 fl    MPV 8.8 6.0 - 12.0 fL    Platelets 317 140 - 450 10*3/mm3    Neutrophil % 64.4 42.7 - 76.0 %    Lymphocyte % 26.1 19.6 - 45.3 %    Monocyte % 7.3 5.0 - 12.0 %    Eosinophil % 1.5 0.3 - 6.2 %    Basophil % 0.4 0.0 - 1.5 %    Immature Grans % 0.3 0.0 - 0.5 %    Neutrophils, Absolute 4.88 1.70 - 7.00 10*3/mm3    Lymphocytes, Absolute 1.97 0.70 - 3.10 10*3/mm3    Monocytes, Absolute 0.55 0.10 - 0.90 10*3/mm3    Eosinophils, Absolute 0.11 0.00 - 0.40 10*3/mm3    Basophils, Absolute 0.03 0.00 - 0.20 10*3/mm3    Immature Grans, Absolute 0.02 0.00 - 0.05 10*3/mm3    nRBC 0.0 0.0 - 0.2 /100 WBC   Urinalysis, Microscopic Only - Urine, Clean Catch    Specimen: Urine, Clean Catch   Result Value Ref Range    RBC, UA 0-2 None Seen, 0-2 /HPF    WBC, UA 3-5 (A) None Seen, 0-2 /HPF    Bacteria, UA None Seen None Seen /HPF    Squamous Epithelial Cells, UA 3-6 (A) None Seen, 0-2 /HPF    Yeast, UA Small/1+ Budding Yeast None Seen  /HPF    Hyaline Casts, UA None Seen None Seen /LPF    Methodology Manual Light Microscopy    Pregnancy, Urine - Urine, Clean Catch    Specimen: Urine, Clean Catch   Result Value Ref Range    HCG, Urine QL Negative Negative   Green Top (Gel)   Result Value Ref Range    Extra Tube Hold for add-ons.    Lavender Top   Result Value Ref Range    Extra Tube hold for add-on    Gold Top - SST   Result Value Ref Range    Extra Tube Hold for add-ons.    Light Blue Top   Result Value Ref Range    Extra Tube Hold for add-ons.                Assessment and Plan:          Diagnoses and all orders for this visit:    1. Calculus of gallbladder with acute cholecystitis without obstruction (Primary)  Assessment & Plan:  Reviewed CT of abd pelvis, discussed gallstones, dietary measures, avoid fatty foods, reviewed recent labs, send to surgeon  She does not want anything for nausea, was advised to take pepcid, she is going to pick that up and take if needed     Orders:  -     Ambulatory Referral to General Surgery        Follow Up {Wrapup  Communications :23}  Return if symptoms worsen or fail to improve.  Patient was given instructions and counseling regarding her condition or for health maintenance advice. Please see specific information pulled into the AVS if appropriate.

## 2022-11-28 DIAGNOSIS — F41.9 ANXIETY: ICD-10-CM

## 2022-11-28 RX ORDER — BUSPIRONE HYDROCHLORIDE 10 MG/1
10 TABLET ORAL DAILY
Qty: 90 TABLET | Refills: 1 | Status: SHIPPED | OUTPATIENT
Start: 2022-11-28 | End: 2023-02-09 | Stop reason: SDUPTHER

## 2022-12-08 NOTE — H&P (VIEW-ONLY)
SURGERY  Mazinsavi Steve   1980 12/14/22    Chief Complaint: Gallstones    HPI    Ms. Steve is a  nice 42 y.o. female referred by Mackenzie Hui with gallstones.  This summer, she had a feeling of fullness, then intense diarrhea.  She had no focal abdominal pain but her fullness lasted for about a month.  She still gets that sense of fullness after eating.  She has no family history of gastroparesis and has no risk factors.  Both her sister and brother had cholecystectomies for stones, her brother having a common duct stones with jaundice.      She had a CT scan at [facility] 7/19/2022 showing gallstones with no acute intra-abdominal pelvic process.  Incidentally she had a small hiatal hernia with thickening of the distal esophagus.  No swallowing problems, only rare heartburn but takes TUMS once weekly.  She is not interested in pursuing an EGD prior or during lap chol but instead afterwards if symptoms persist.    Past Medical History:   Diagnosis Date   • Anxiety    • Depression    • Joint pain    • Numbness    • Self-injurious behavior      No past surgical history on file.  Family History   Problem Relation Age of Onset   • Depression Mother    • Anxiety disorder Mother    • Seizures Father    • Depression Father    • Gallbladder disease Sister    • Gallbladder disease Brother    • Alcohol abuse Maternal Grandfather      Social History     Socioeconomic History   • Marital status: Single   Tobacco Use   • Smoking status: Never   • Smokeless tobacco: Never   Vaping Use   • Vaping Use: Never used   Substance and Sexual Activity   • Alcohol use: Not Currently   • Drug use: Never   • Sexual activity: Not Currently         Current Outpatient Medications:   •  busPIRone (BUSPAR) 10 MG tablet, Take 1 tablet by mouth Daily., Disp: 90 tablet, Rfl: 1  •  OXcarbazepine (TRILEPTAL) 300 MG tablet, Take 1 tablet by mouth Every Night, Disp: 30 tablet, Rfl: 1    Allergies   Allergen Reactions   • Latex Other (See  "Comments)     Contact dermatitis       PHYSICAL EXAM:    Ht 162.6 cm (64\")   Wt 113 kg (249 lb)   BMI 42.74 kg/m²   Body mass index is 42.74 kg/m².    Constitutional: well developed, well nourished, obese, appears  healthy, stated age  ENMT: Hearing intact, neck without masses  CVS: RRR, no murmur, no peripheral edema  Respiratory: CTA, normal respiratory effort   Gastrointestinal: abdomen soft, rounded, abdominal wall depth estimated 5 cm, abdominal hernia not detected  Musculoskeletal: gait normal, muscle mass normal  Neurological: awake and alert, seems to have reasonable capacity for understanding for medical decision making  Psychiatric: appears to have reasonable judgement, pleasant, sweet    Radiographic/Lab Findings:       Reviewed: images with stones in them, meaning of hiatal hernia, risks of surgery including bile duct leak, need to open, post cholecystectomy diarrhea, DVT with increased risk due to her BMI.    IMPRESSION:  · Gallstones with bloating  · Family history of jaundice secondary to common duct stones.  · Family history of gallstones, brother and sister.  · Body mass index is 42.74 kg/m².  · Anxiety on buspar  · Trileptal use.    PLAN:  · Lap chol with xray.  Discussed that lap liver biopsy will be recommended if pre op LFT's show elevation.  · Advised of increased risk of DVT and infection.  · Continue meds bg op.   · EGD if symptoms persist of need for TUMS.    Graciela Robles MD  14:12 EST      In order to provide a more personal and interactive patient experience as well as improve efficiency, this note was started prior to the office visit, including review of past history and pertinent images, surgeries.      "

## 2022-12-08 NOTE — PROGRESS NOTES
SURGERY  Mazinsavi Steve   1980 12/14/22    Chief Complaint: Gallstones    HPI    Ms. Steve is a  nice 42 y.o. female referred by Mackenzie Hui with gallstones.  This summer, she had a feeling of fullness, then intense diarrhea.  She had no focal abdominal pain but her fullness lasted for about a month.  She still gets that sense of fullness after eating.  She has no family history of gastroparesis and has no risk factors.  Both her sister and brother had cholecystectomies for stones, her brother having a common duct stones with jaundice.      She had a CT scan at [facility] 7/19/2022 showing gallstones with no acute intra-abdominal pelvic process.  Incidentally she had a small hiatal hernia with thickening of the distal esophagus.  No swallowing problems, only rare heartburn but takes TUMS once weekly.  She is not interested in pursuing an EGD prior or during lap chol but instead afterwards if symptoms persist.    Past Medical History:   Diagnosis Date   • Anxiety    • Depression    • Joint pain    • Numbness    • Self-injurious behavior      No past surgical history on file.  Family History   Problem Relation Age of Onset   • Depression Mother    • Anxiety disorder Mother    • Seizures Father    • Depression Father    • Gallbladder disease Sister    • Gallbladder disease Brother    • Alcohol abuse Maternal Grandfather      Social History     Socioeconomic History   • Marital status: Single   Tobacco Use   • Smoking status: Never   • Smokeless tobacco: Never   Vaping Use   • Vaping Use: Never used   Substance and Sexual Activity   • Alcohol use: Not Currently   • Drug use: Never   • Sexual activity: Not Currently         Current Outpatient Medications:   •  busPIRone (BUSPAR) 10 MG tablet, Take 1 tablet by mouth Daily., Disp: 90 tablet, Rfl: 1  •  OXcarbazepine (TRILEPTAL) 300 MG tablet, Take 1 tablet by mouth Every Night, Disp: 30 tablet, Rfl: 1    Allergies   Allergen Reactions   • Latex Other (See  "Comments)     Contact dermatitis       PHYSICAL EXAM:    Ht 162.6 cm (64\")   Wt 113 kg (249 lb)   BMI 42.74 kg/m²   Body mass index is 42.74 kg/m².    Constitutional: well developed, well nourished, obese, appears  healthy, stated age  ENMT: Hearing intact, neck without masses  CVS: RRR, no murmur, no peripheral edema  Respiratory: CTA, normal respiratory effort   Gastrointestinal: abdomen soft, rounded, abdominal wall depth estimated 5 cm, abdominal hernia not detected  Musculoskeletal: gait normal, muscle mass normal  Neurological: awake and alert, seems to have reasonable capacity for understanding for medical decision making  Psychiatric: appears to have reasonable judgement, pleasant, sweet    Radiographic/Lab Findings:       Reviewed: images with stones in them, meaning of hiatal hernia, risks of surgery including bile duct leak, need to open, post cholecystectomy diarrhea, DVT with increased risk due to her BMI.    IMPRESSION:  · Gallstones with bloating  · Family history of jaundice secondary to common duct stones.  · Family history of gallstones, brother and sister.  · Body mass index is 42.74 kg/m².  · Anxiety on buspar  · Trileptal use.    PLAN:  · Lap chol with xray.  Discussed that lap liver biopsy will be recommended if pre op LFT's show elevation.  · Advised of increased risk of DVT and infection.  · Continue meds bg op.   · EGD if symptoms persist of need for TUMS.    Graciela Robles MD  14:12 EST      In order to provide a more personal and interactive patient experience as well as improve efficiency, this note was started prior to the office visit, including review of past history and pertinent images, surgeries.      "

## 2022-12-14 ENCOUNTER — PREP FOR SURGERY (OUTPATIENT)
Dept: OTHER | Facility: HOSPITAL | Age: 42
End: 2022-12-14

## 2022-12-14 ENCOUNTER — OFFICE VISIT (OUTPATIENT)
Dept: SURGERY | Facility: CLINIC | Age: 42
End: 2022-12-14

## 2022-12-14 VITALS — BODY MASS INDEX: 42.51 KG/M2 | WEIGHT: 249 LBS | HEIGHT: 64 IN

## 2022-12-14 DIAGNOSIS — K80.20 GALLSTONES: Primary | ICD-10-CM

## 2022-12-14 DIAGNOSIS — K80.00 CALCULUS OF GALLBLADDER WITH ACUTE CHOLECYSTITIS WITHOUT OBSTRUCTION: ICD-10-CM

## 2022-12-14 PROCEDURE — 99203 OFFICE O/P NEW LOW 30 MIN: CPT | Performed by: SURGERY

## 2022-12-14 RX ORDER — ONDANSETRON 2 MG/ML
4 INJECTION INTRAMUSCULAR; INTRAVENOUS EVERY 6 HOURS PRN
Status: CANCELLED | OUTPATIENT
Start: 2022-12-14

## 2022-12-14 RX ORDER — SODIUM CHLORIDE 9 MG/ML
40 INJECTION, SOLUTION INTRAVENOUS AS NEEDED
Status: CANCELLED | OUTPATIENT
Start: 2023-01-12

## 2022-12-14 RX ORDER — ACETAMINOPHEN 500 MG
1000 TABLET ORAL ONCE
Status: CANCELLED | OUTPATIENT
Start: 2023-01-12 | End: 2022-12-14

## 2022-12-14 RX ORDER — SODIUM CHLORIDE 0.9 % (FLUSH) 0.9 %
10 SYRINGE (ML) INJECTION EVERY 12 HOURS SCHEDULED
Status: CANCELLED | OUTPATIENT
Start: 2023-01-12

## 2022-12-14 RX ORDER — CEFAZOLIN SODIUM IN 0.9 % NACL 3 G/100 ML
3 INTRAVENOUS SOLUTION, PIGGYBACK (ML) INTRAVENOUS ONCE
Status: CANCELLED | OUTPATIENT
Start: 2023-01-12 | End: 2022-12-14

## 2022-12-14 RX ORDER — SODIUM CHLORIDE 0.9 % (FLUSH) 0.9 %
10 SYRINGE (ML) INJECTION AS NEEDED
Status: CANCELLED | OUTPATIENT
Start: 2023-01-12

## 2022-12-14 RX ORDER — OXYCODONE HCL 10 MG/1
10 TABLET, FILM COATED, EXTENDED RELEASE ORAL ONCE
Status: CANCELLED | OUTPATIENT
Start: 2023-01-12 | End: 2022-12-14

## 2022-12-16 ENCOUNTER — PATIENT ROUNDING (BHMG ONLY) (OUTPATIENT)
Dept: SURGERY | Facility: CLINIC | Age: 42
End: 2022-12-16

## 2022-12-16 NOTE — PROGRESS NOTES
December 16, 2022    Hello, may I speak with Mazin Steve?    My name is Feliz     I am  with [facility]  [address]  [place] [address] [phone].    Before we get started may I verify your date of birth? 1980    I am calling to officially welcome you to our practice and ask about your recent visit. Is this a good time to talk? yes    Tell me about your visit with us. What things went well?  Everything went well.       We're always looking for ways to make our patients' experiences even better. Do you have recommendations on ways we may improve?  no    Overall were you satisfied with your first visit to our practice? yes       I appreciate you taking the time to speak with me today. Is there anything else I can do for you? no      Thank you, and have a great day.

## 2023-01-05 ENCOUNTER — PRE-ADMISSION TESTING (OUTPATIENT)
Dept: PREADMISSION TESTING | Facility: HOSPITAL | Age: 43
End: 2023-01-05
Payer: COMMERCIAL

## 2023-01-05 VITALS
HEIGHT: 63 IN | RESPIRATION RATE: 16 BRPM | OXYGEN SATURATION: 100 % | BODY MASS INDEX: 44.12 KG/M2 | SYSTOLIC BLOOD PRESSURE: 129 MMHG | TEMPERATURE: 98.1 F | HEART RATE: 82 BPM | WEIGHT: 249 LBS | DIASTOLIC BLOOD PRESSURE: 88 MMHG

## 2023-01-05 DIAGNOSIS — K80.20 GALLSTONES: ICD-10-CM

## 2023-01-05 LAB
ALBUMIN SERPL-MCNC: 4.3 G/DL (ref 3.5–5.2)
ALBUMIN/GLOB SERPL: 1.8 G/DL
ALP SERPL-CCNC: 72 U/L (ref 39–117)
ALT SERPL W P-5'-P-CCNC: 8 U/L (ref 1–33)
ANION GAP SERPL CALCULATED.3IONS-SCNC: 6 MMOL/L (ref 5–15)
AST SERPL-CCNC: 15 U/L (ref 1–32)
BILIRUB SERPL-MCNC: 0.2 MG/DL (ref 0–1.2)
BUN SERPL-MCNC: 11 MG/DL (ref 6–20)
BUN/CREAT SERPL: 17.2 (ref 7–25)
CALCIUM SPEC-SCNC: 8.8 MG/DL (ref 8.6–10.5)
CHLORIDE SERPL-SCNC: 104 MMOL/L (ref 98–107)
CO2 SERPL-SCNC: 29 MMOL/L (ref 22–29)
CREAT SERPL-MCNC: 0.64 MG/DL (ref 0.57–1)
DEPRECATED RDW RBC AUTO: 44.3 FL (ref 37–54)
EGFRCR SERPLBLD CKD-EPI 2021: 112.6 ML/MIN/1.73
ERYTHROCYTE [DISTWIDTH] IN BLOOD BY AUTOMATED COUNT: 13.6 % (ref 12.3–15.4)
GLOBULIN UR ELPH-MCNC: 2.4 GM/DL
GLUCOSE SERPL-MCNC: 99 MG/DL (ref 65–99)
HCT VFR BLD AUTO: 34.2 % (ref 34–46.6)
HGB BLD-MCNC: 11.1 G/DL (ref 12–15.9)
MCH RBC QN AUTO: 28.2 PG (ref 26.6–33)
MCHC RBC AUTO-ENTMCNC: 32.5 G/DL (ref 31.5–35.7)
MCV RBC AUTO: 87 FL (ref 79–97)
PLATELET # BLD AUTO: 289 10*3/MM3 (ref 140–450)
PMV BLD AUTO: 8.6 FL (ref 6–12)
POTASSIUM SERPL-SCNC: 3.9 MMOL/L (ref 3.5–5.2)
PROT SERPL-MCNC: 6.7 G/DL (ref 6–8.5)
RBC # BLD AUTO: 3.93 10*6/MM3 (ref 3.77–5.28)
SODIUM SERPL-SCNC: 139 MMOL/L (ref 136–145)
WBC NRBC COR # BLD: 8.92 10*3/MM3 (ref 3.4–10.8)

## 2023-01-05 PROCEDURE — 85027 COMPLETE CBC AUTOMATED: CPT

## 2023-01-05 PROCEDURE — 36415 COLL VENOUS BLD VENIPUNCTURE: CPT

## 2023-01-05 PROCEDURE — 80053 COMPREHEN METABOLIC PANEL: CPT

## 2023-01-05 NOTE — DISCHARGE INSTRUCTIONS

## 2023-01-10 ENCOUNTER — TELEPHONE (OUTPATIENT)
Dept: SURGERY | Facility: CLINIC | Age: 43
End: 2023-01-10
Payer: COMMERCIAL

## 2023-01-10 NOTE — TELEPHONE ENCOUNTER
Patient left a voice mail stating Dr. Robles told her to be off work for ten days, and she will return to work on 01/23/2023.

## 2023-01-12 ENCOUNTER — ANESTHESIA (OUTPATIENT)
Dept: PERIOP | Facility: HOSPITAL | Age: 43
End: 2023-01-12
Payer: COMMERCIAL

## 2023-01-12 ENCOUNTER — HOSPITAL ENCOUNTER (OUTPATIENT)
Facility: HOSPITAL | Age: 43
Setting detail: HOSPITAL OUTPATIENT SURGERY
Discharge: HOME | End: 2023-01-12
Attending: SURGERY | Admitting: SURGERY
Payer: COMMERCIAL

## 2023-01-12 ENCOUNTER — APPOINTMENT (OUTPATIENT)
Dept: GENERAL RADIOLOGY | Facility: HOSPITAL | Age: 43
End: 2023-01-12
Payer: COMMERCIAL

## 2023-01-12 ENCOUNTER — ANESTHESIA EVENT (OUTPATIENT)
Dept: PERIOP | Facility: HOSPITAL | Age: 43
End: 2023-01-12
Payer: COMMERCIAL

## 2023-01-12 VITALS
RESPIRATION RATE: 16 BRPM | BODY MASS INDEX: 44.11 KG/M2 | SYSTOLIC BLOOD PRESSURE: 113 MMHG | DIASTOLIC BLOOD PRESSURE: 67 MMHG | TEMPERATURE: 97.6 F | HEIGHT: 63 IN | HEART RATE: 74 BPM | OXYGEN SATURATION: 97 %

## 2023-01-12 DIAGNOSIS — K80.20 GALLSTONES: ICD-10-CM

## 2023-01-12 DIAGNOSIS — K80.00 CALCULUS OF GALLBLADDER WITH ACUTE CHOLECYSTITIS WITHOUT OBSTRUCTION: Primary | ICD-10-CM

## 2023-01-12 LAB
B-HCG UR QL: NEGATIVE
EXPIRATION DATE: NORMAL
INTERNAL NEGATIVE CONTROL: NEGATIVE
INTERNAL POSITIVE CONTROL: POSITIVE
Lab: NORMAL

## 2023-01-12 PROCEDURE — 0 IOTHALAMATE 60 % SOLUTION: Performed by: SURGERY

## 2023-01-12 PROCEDURE — 25010000002 CEFAZOLIN IN DEXTROSE 2-4 GM/100ML-% SOLUTION: Performed by: SURGERY

## 2023-01-12 PROCEDURE — 25010000002 KETOROLAC TROMETHAMINE PER 15 MG: Performed by: NURSE ANESTHETIST, CERTIFIED REGISTERED

## 2023-01-12 PROCEDURE — 25010000002 FENTANYL CITRATE (PF) 50 MCG/ML SOLUTION: Performed by: NURSE ANESTHETIST, CERTIFIED REGISTERED

## 2023-01-12 PROCEDURE — 25010000002 ONDANSETRON PER 1 MG: Performed by: NURSE ANESTHETIST, CERTIFIED REGISTERED

## 2023-01-12 PROCEDURE — 47563 LAPARO CHOLECYSTECTOMY/GRAPH: CPT | Performed by: SPECIALIST/TECHNOLOGIST, OTHER

## 2023-01-12 PROCEDURE — 25010000002 PROPOFOL 10 MG/ML EMULSION: Performed by: NURSE ANESTHETIST, CERTIFIED REGISTERED

## 2023-01-12 PROCEDURE — 88304 TISSUE EXAM BY PATHOLOGIST: CPT | Performed by: SURGERY

## 2023-01-12 PROCEDURE — 25010000002 DEXAMETHASONE PER 1 MG: Performed by: NURSE ANESTHETIST, CERTIFIED REGISTERED

## 2023-01-12 PROCEDURE — 81025 URINE PREGNANCY TEST: CPT | Performed by: ANESTHESIOLOGY

## 2023-01-12 PROCEDURE — 25010000002 MIDAZOLAM PER 1 MG: Performed by: ANESTHESIOLOGY

## 2023-01-12 PROCEDURE — 74300 X-RAY BILE DUCTS/PANCREAS: CPT

## 2023-01-12 PROCEDURE — 47563 LAPARO CHOLECYSTECTOMY/GRAPH: CPT | Performed by: SURGERY

## 2023-01-12 PROCEDURE — 25010000002 HYDROMORPHONE 1 MG/ML SOLUTION: Performed by: NURSE ANESTHETIST, CERTIFIED REGISTERED

## 2023-01-12 DEVICE — HORIZON TI ML 6 CLIPS/CART
Type: IMPLANTABLE DEVICE | Site: ABDOMEN | Status: FUNCTIONAL
Brand: WECK

## 2023-01-12 RX ORDER — LABETALOL HYDROCHLORIDE 5 MG/ML
5 INJECTION, SOLUTION INTRAVENOUS
Status: DISCONTINUED | OUTPATIENT
Start: 2023-01-12 | End: 2023-01-12 | Stop reason: HOSPADM

## 2023-01-12 RX ORDER — FLUMAZENIL 0.1 MG/ML
0.2 INJECTION INTRAVENOUS AS NEEDED
Status: DISCONTINUED | OUTPATIENT
Start: 2023-01-12 | End: 2023-01-12 | Stop reason: HOSPADM

## 2023-01-12 RX ORDER — MIDAZOLAM HYDROCHLORIDE 1 MG/ML
1 INJECTION INTRAMUSCULAR; INTRAVENOUS
Status: COMPLETED | OUTPATIENT
Start: 2023-01-12 | End: 2023-01-12

## 2023-01-12 RX ORDER — TRAMADOL HYDROCHLORIDE 50 MG/1
50 TABLET ORAL EVERY 4 HOURS PRN
Qty: 7 TABLET | Refills: 0 | Status: SHIPPED | OUTPATIENT
Start: 2023-01-12 | End: 2023-02-06

## 2023-01-12 RX ORDER — NALOXONE HCL 0.4 MG/ML
0.2 VIAL (ML) INJECTION AS NEEDED
Status: DISCONTINUED | OUTPATIENT
Start: 2023-01-12 | End: 2023-01-12 | Stop reason: HOSPADM

## 2023-01-12 RX ORDER — PROMETHAZINE HYDROCHLORIDE 25 MG/1
25 TABLET ORAL ONCE AS NEEDED
Status: DISCONTINUED | OUTPATIENT
Start: 2023-01-12 | End: 2023-01-12 | Stop reason: HOSPADM

## 2023-01-12 RX ORDER — MAGNESIUM HYDROXIDE 1200 MG/15ML
LIQUID ORAL AS NEEDED
Status: DISCONTINUED | OUTPATIENT
Start: 2023-01-12 | End: 2023-01-12 | Stop reason: HOSPADM

## 2023-01-12 RX ORDER — KETOROLAC TROMETHAMINE 30 MG/ML
INJECTION, SOLUTION INTRAMUSCULAR; INTRAVENOUS AS NEEDED
Status: DISCONTINUED | OUTPATIENT
Start: 2023-01-12 | End: 2023-01-12 | Stop reason: SURG

## 2023-01-12 RX ORDER — ONDANSETRON 2 MG/ML
4 INJECTION INTRAMUSCULAR; INTRAVENOUS EVERY 6 HOURS PRN
Status: DISCONTINUED | OUTPATIENT
Start: 2023-01-12 | End: 2023-01-12 | Stop reason: HOSPADM

## 2023-01-12 RX ORDER — SODIUM CHLORIDE 0.9 % (FLUSH) 0.9 %
10 SYRINGE (ML) INJECTION AS NEEDED
Status: DISCONTINUED | OUTPATIENT
Start: 2023-01-12 | End: 2023-01-12 | Stop reason: HOSPADM

## 2023-01-12 RX ORDER — FENTANYL CITRATE 50 UG/ML
50 INJECTION, SOLUTION INTRAMUSCULAR; INTRAVENOUS
Status: DISCONTINUED | OUTPATIENT
Start: 2023-01-12 | End: 2023-01-12 | Stop reason: HOSPADM

## 2023-01-12 RX ORDER — CEFAZOLIN SODIUM IN 0.9 % NACL 3 G/100 ML
3 INTRAVENOUS SOLUTION, PIGGYBACK (ML) INTRAVENOUS ONCE
Status: DISCONTINUED | OUTPATIENT
Start: 2023-01-12 | End: 2023-01-12 | Stop reason: DRUGHIGH

## 2023-01-12 RX ORDER — OXYCODONE HCL 10 MG/1
10 TABLET, FILM COATED, EXTENDED RELEASE ORAL ONCE
Status: COMPLETED | OUTPATIENT
Start: 2023-01-12 | End: 2023-01-12

## 2023-01-12 RX ORDER — SODIUM CHLORIDE 0.9 % (FLUSH) 0.9 %
10 SYRINGE (ML) INJECTION EVERY 12 HOURS SCHEDULED
Status: DISCONTINUED | OUTPATIENT
Start: 2023-01-12 | End: 2023-01-12 | Stop reason: HOSPADM

## 2023-01-12 RX ORDER — SODIUM CHLORIDE 9 MG/ML
40 INJECTION, SOLUTION INTRAVENOUS AS NEEDED
Status: DISCONTINUED | OUTPATIENT
Start: 2023-01-12 | End: 2023-01-12 | Stop reason: HOSPADM

## 2023-01-12 RX ORDER — HYDROCODONE BITARTRATE AND ACETAMINOPHEN 7.5; 325 MG/1; MG/1
1 TABLET ORAL ONCE AS NEEDED
Status: DISCONTINUED | OUTPATIENT
Start: 2023-01-12 | End: 2023-01-12 | Stop reason: HOSPADM

## 2023-01-12 RX ORDER — FAMOTIDINE 10 MG/ML
20 INJECTION, SOLUTION INTRAVENOUS ONCE
Status: COMPLETED | OUTPATIENT
Start: 2023-01-12 | End: 2023-01-12

## 2023-01-12 RX ORDER — ONDANSETRON 2 MG/ML
4 INJECTION INTRAMUSCULAR; INTRAVENOUS ONCE AS NEEDED
Status: COMPLETED | OUTPATIENT
Start: 2023-01-12 | End: 2023-01-12

## 2023-01-12 RX ORDER — BUPIVACAINE HYDROCHLORIDE AND EPINEPHRINE 5; 5 MG/ML; UG/ML
INJECTION, SOLUTION EPIDURAL; INTRACAUDAL; PERINEURAL AS NEEDED
Status: DISCONTINUED | OUTPATIENT
Start: 2023-01-12 | End: 2023-01-12 | Stop reason: HOSPADM

## 2023-01-12 RX ORDER — IBUPROFEN 200 MG
200 TABLET ORAL EVERY 6 HOURS PRN
COMMUNITY

## 2023-01-12 RX ORDER — ROCURONIUM BROMIDE 10 MG/ML
INJECTION, SOLUTION INTRAVENOUS AS NEEDED
Status: DISCONTINUED | OUTPATIENT
Start: 2023-01-12 | End: 2023-01-12 | Stop reason: SURG

## 2023-01-12 RX ORDER — SODIUM CHLORIDE 9 MG/ML
INJECTION, SOLUTION INTRAVENOUS AS NEEDED
Status: DISCONTINUED | OUTPATIENT
Start: 2023-01-12 | End: 2023-01-12 | Stop reason: HOSPADM

## 2023-01-12 RX ORDER — EPHEDRINE SULFATE 50 MG/ML
5 INJECTION, SOLUTION INTRAVENOUS ONCE AS NEEDED
Status: DISCONTINUED | OUTPATIENT
Start: 2023-01-12 | End: 2023-01-12 | Stop reason: HOSPADM

## 2023-01-12 RX ORDER — PROPOFOL 10 MG/ML
VIAL (ML) INTRAVENOUS AS NEEDED
Status: DISCONTINUED | OUTPATIENT
Start: 2023-01-12 | End: 2023-01-12 | Stop reason: SURG

## 2023-01-12 RX ORDER — SODIUM CHLORIDE, SODIUM LACTATE, POTASSIUM CHLORIDE, CALCIUM CHLORIDE 600; 310; 30; 20 MG/100ML; MG/100ML; MG/100ML; MG/100ML
9 INJECTION, SOLUTION INTRAVENOUS CONTINUOUS
Status: DISCONTINUED | OUTPATIENT
Start: 2023-01-12 | End: 2023-01-12 | Stop reason: HOSPADM

## 2023-01-12 RX ORDER — ACETAMINOPHEN 500 MG
1000 TABLET ORAL ONCE
Status: COMPLETED | OUTPATIENT
Start: 2023-01-12 | End: 2023-01-12

## 2023-01-12 RX ORDER — PROMETHAZINE HYDROCHLORIDE 25 MG/1
25 SUPPOSITORY RECTAL ONCE AS NEEDED
Status: DISCONTINUED | OUTPATIENT
Start: 2023-01-12 | End: 2023-01-12 | Stop reason: HOSPADM

## 2023-01-12 RX ORDER — OXYCODONE AND ACETAMINOPHEN 7.5; 325 MG/1; MG/1
1 TABLET ORAL EVERY 4 HOURS PRN
Status: DISCONTINUED | OUTPATIENT
Start: 2023-01-12 | End: 2023-01-12 | Stop reason: HOSPADM

## 2023-01-12 RX ORDER — MIDAZOLAM HYDROCHLORIDE 1 MG/ML
1 INJECTION INTRAMUSCULAR; INTRAVENOUS
Status: DISCONTINUED | OUTPATIENT
Start: 2023-01-12 | End: 2023-01-12 | Stop reason: HOSPADM

## 2023-01-12 RX ORDER — DEXAMETHASONE SODIUM PHOSPHATE 4 MG/ML
INJECTION, SOLUTION INTRA-ARTICULAR; INTRALESIONAL; INTRAMUSCULAR; INTRAVENOUS; SOFT TISSUE AS NEEDED
Status: DISCONTINUED | OUTPATIENT
Start: 2023-01-12 | End: 2023-01-12 | Stop reason: SURG

## 2023-01-12 RX ORDER — DIPHENHYDRAMINE HCL 25 MG
25 CAPSULE ORAL
Status: DISCONTINUED | OUTPATIENT
Start: 2023-01-12 | End: 2023-01-12 | Stop reason: HOSPADM

## 2023-01-12 RX ORDER — LIDOCAINE HYDROCHLORIDE 20 MG/ML
INJECTION, SOLUTION INFILTRATION; PERINEURAL AS NEEDED
Status: DISCONTINUED | OUTPATIENT
Start: 2023-01-12 | End: 2023-01-12 | Stop reason: SURG

## 2023-01-12 RX ORDER — DIPHENHYDRAMINE HYDROCHLORIDE 50 MG/ML
12.5 INJECTION INTRAMUSCULAR; INTRAVENOUS
Status: DISCONTINUED | OUTPATIENT
Start: 2023-01-12 | End: 2023-01-12 | Stop reason: HOSPADM

## 2023-01-12 RX ORDER — HYDROMORPHONE HYDROCHLORIDE 1 MG/ML
0.5 INJECTION, SOLUTION INTRAMUSCULAR; INTRAVENOUS; SUBCUTANEOUS
Status: DISCONTINUED | OUTPATIENT
Start: 2023-01-12 | End: 2023-01-12 | Stop reason: HOSPADM

## 2023-01-12 RX ORDER — DROPERIDOL 2.5 MG/ML
0.62 INJECTION, SOLUTION INTRAMUSCULAR; INTRAVENOUS ONCE AS NEEDED
Status: DISCONTINUED | OUTPATIENT
Start: 2023-01-12 | End: 2023-01-12 | Stop reason: HOSPADM

## 2023-01-12 RX ORDER — ONDANSETRON 4 MG/1
4 TABLET, FILM COATED ORAL EVERY 8 HOURS PRN
Qty: 5 TABLET | Refills: 0 | Status: SHIPPED | OUTPATIENT
Start: 2023-01-12 | End: 2023-02-06

## 2023-01-12 RX ORDER — CEFAZOLIN SODIUM 2 G/100ML
2 INJECTION, SOLUTION INTRAVENOUS ONCE
Status: COMPLETED | OUTPATIENT
Start: 2023-01-12 | End: 2023-01-12

## 2023-01-12 RX ORDER — ONDANSETRON 2 MG/ML
INJECTION INTRAMUSCULAR; INTRAVENOUS AS NEEDED
Status: DISCONTINUED | OUTPATIENT
Start: 2023-01-12 | End: 2023-01-12 | Stop reason: SURG

## 2023-01-12 RX ORDER — FENTANYL CITRATE 50 UG/ML
INJECTION, SOLUTION INTRAMUSCULAR; INTRAVENOUS AS NEEDED
Status: DISCONTINUED | OUTPATIENT
Start: 2023-01-12 | End: 2023-01-12 | Stop reason: SURG

## 2023-01-12 RX ADMIN — MIDAZOLAM 1 MG: 1 INJECTION INTRAMUSCULAR; INTRAVENOUS at 08:32

## 2023-01-12 RX ADMIN — HYDROMORPHONE HYDROCHLORIDE 0.5 MG: 1 INJECTION, SOLUTION INTRAMUSCULAR; INTRAVENOUS; SUBCUTANEOUS at 10:57

## 2023-01-12 RX ADMIN — MIDAZOLAM 1 MG: 1 INJECTION INTRAMUSCULAR; INTRAVENOUS at 09:40

## 2023-01-12 RX ADMIN — PROPOFOL 170 MG: 10 INJECTION, EMULSION INTRAVENOUS at 10:01

## 2023-01-12 RX ADMIN — SODIUM CHLORIDE, POTASSIUM CHLORIDE, SODIUM LACTATE AND CALCIUM CHLORIDE: 600; 310; 30; 20 INJECTION, SOLUTION INTRAVENOUS at 11:10

## 2023-01-12 RX ADMIN — LIDOCAINE HYDROCHLORIDE 60 MG: 20 INJECTION, SOLUTION INFILTRATION; PERINEURAL at 10:01

## 2023-01-12 RX ADMIN — ONDANSETRON 4 MG: 2 INJECTION INTRAMUSCULAR; INTRAVENOUS at 10:11

## 2023-01-12 RX ADMIN — ACETAMINOPHEN 1000 MG: 500 TABLET, FILM COATED ORAL at 08:23

## 2023-01-12 RX ADMIN — KETOROLAC TROMETHAMINE 30 MG: 30 INJECTION, SOLUTION INTRAMUSCULAR at 10:57

## 2023-01-12 RX ADMIN — SODIUM CHLORIDE, POTASSIUM CHLORIDE, SODIUM LACTATE AND CALCIUM CHLORIDE 9 ML/HR: 600; 310; 30; 20 INJECTION, SOLUTION INTRAVENOUS at 08:33

## 2023-01-12 RX ADMIN — ROCURONIUM BROMIDE 50 MG: 10 INJECTION, SOLUTION INTRAVENOUS at 10:01

## 2023-01-12 RX ADMIN — PROPOFOL 25 MCG/KG/MIN: 10 INJECTION, EMULSION INTRAVENOUS at 10:19

## 2023-01-12 RX ADMIN — OXYCODONE HYDROCHLORIDE 10 MG: 10 TABLET, FILM COATED, EXTENDED RELEASE ORAL at 08:23

## 2023-01-12 RX ADMIN — SUGAMMADEX 200 MG: 100 INJECTION, SOLUTION INTRAVENOUS at 11:00

## 2023-01-12 RX ADMIN — FAMOTIDINE 20 MG: 10 INJECTION INTRAVENOUS at 08:32

## 2023-01-12 RX ADMIN — ONDANSETRON 4 MG: 2 INJECTION INTRAMUSCULAR; INTRAVENOUS at 12:08

## 2023-01-12 RX ADMIN — FENTANYL CITRATE 50 MCG: 50 INJECTION, SOLUTION INTRAMUSCULAR; INTRAVENOUS at 10:01

## 2023-01-12 RX ADMIN — CEFAZOLIN SODIUM 2 G: 2 INJECTION, SOLUTION INTRAVENOUS at 09:50

## 2023-01-12 RX ADMIN — DEXAMETHASONE SODIUM PHOSPHATE 6 MG: 4 INJECTION, SOLUTION INTRA-ARTICULAR; INTRALESIONAL; INTRAMUSCULAR; INTRAVENOUS; SOFT TISSUE at 10:07

## 2023-01-12 NOTE — ANESTHESIA POSTPROCEDURE EVALUATION
"Patient: Mazin Steve    Procedure Summary     Date: 01/12/23 Room / Location: [facility] OR [address] [place] [address] MAIN OR    Anesthesia Start: 0954 Anesthesia Stop: 1120    Procedure: laparoscopic cholecystectomy with cholangiogram (Abdomen) Diagnosis:       Gallstones      (Gallstones [K80.20])    Surgeons: Graciela Robles MD Provider: Manpreet Arnett MD    Anesthesia Type: Not recorded ASA Status: 3          Anesthesia Type: No value filed.    Vitals  Vitals Value Taken Time   /66 01/12/23 1201   Temp     Pulse 71 01/12/23 1213   Resp 16 01/12/23 1200   SpO2 98 % 01/12/23 1213   Vitals shown include unvalidated device data.        Post Anesthesia Care and Evaluation    Patient location during evaluation: PACU  Patient participation: complete - patient participated  Level of consciousness: awake and alert  Pain management: adequate    Airway patency: patent  Anesthetic complications: No anesthetic complications  PONV Status: controlled  Cardiovascular status: acceptable  Respiratory status: acceptable  Hydration status: acceptable    Comments: /66   Pulse 76   Temp 36.4 °C (97.6 °F) (Oral)   Resp 16   Ht 160 cm (63\")   [facility] 12/23/2022   SpO2 97%   BMI 44.11 kg/m²         "

## 2023-01-12 NOTE — ANESTHESIA PROCEDURE NOTES
Airway  Urgency: elective    Date/Time: 1/12/2023 10:03 AM  Airway not difficult    General Information and Staff    Patient location during procedure: OR  CRNA/CAA: Hanna Santos CRNA    Indications and Patient Condition  Indications for airway management: airway protection    Preoxygenated: yes  Mask difficulty assessment: 1 - vent by mask    Final Airway Details  Final airway type: endotracheal airway      Successful airway: ETT  Cuffed: yes   Successful intubation technique: direct laryngoscopy  Endotracheal tube insertion site: oral  Blade: Griffin  Blade size: 3  ETT size (mm): 7.0  Cormack-Lehane Classification: grade I - full view of glottis  Placement verified by: chest auscultation and capnometry   Measured from: lips  ETT/EBT  to lips (cm): 21  Number of attempts at approach: 1  Assessment: lips, teeth, and gum same as pre-op and atraumatic intubation    Additional Comments   ett cuff up at MOP

## 2023-01-12 NOTE — OP NOTE
Laparoscopic Cholecystectomy :  Travis Steve  1980    Procedure Date: 01/12/23    Pre-op Diagnosis:   • Gallstones with bloating  • Family history of jaundice secondary to common duct stones.  • Family history of gallstones, brother and sister.  • Body mass index is 42.74 kg/m².    Post-op Diagnosis:   · Twisted gallbladder    Procedure: Laparoscopic cholecystectomy with cholangiogram    Surgeon: Travis    Assistant: colleen    Indications: as above    Associated Issues:  • Anxiety on buspar  • Trileptal use.    Findings:   · Very unusual gallbladder with initially the appearance of a diverticulum of the gallbladder or an appendage, later seen to be a complete 180 degree twist back of the infundibulum.  · Normal cholangiogram with leakage at the insertion site known during the process.  · Challenging case due to her habitus requiring unusual entry into the abdomen    Recommendations:   · Routine recuperation    Technique:     General anesthetic was given.  IV antibiotics were given (kefzol).  The abdomen was prepped with ChloraPrep and draped sterilely.  A small incision was made with 11 blade above the umbilicus, CO2 insufflated via the Veress needle, followed by a 5 mm trocar, using the optiview, since i wasn't convinced of the insufflation.  This required intense pressure and while doing so, i visualized an area that i was very uncomfortable could be the mucosa of the bowel.  I thus stopped there and went to the subxyphoid site.  I did a cut down and placed a 11 port and insufflated.  Looking back, i could see that i had entered the falciform without injury to the bowel.  After placement of the two 5 mm ports in the lateral right, i examined the bowel confirming no injury.  Five mm port was placed under direct vision at the umblicus.      The gallbladder had the unusual appearance but after distraction, we could tell it was just twisted.  The cystic duct was fibrotic but easily dissected out.  It  was clipped proximally, opened with the scissors to allow placement of the cholangiocath, which was placed only partially, the opening at a valve, and thus leakage certain to occur.  the cholangiogram performed with findings as noted.  The catheter was then removed and 2 clips were placed on the duct distally and it was divided.  The artery was dissected out as 2 branches.  It was clipped twice proximally, once distally and divided.  The gallbladder was dissected out using the cautery on 20.  The plane between the gallbladder wall and the liver fossa was straightforward .  The gallbladder was placed into an endocatch bag.   Irrigation and suction were carried out to ensure no bilious or bloody efflux from the liver fossa.  The gallbladder was then brought out through the subxiphoid site, doing so with need to enlarge that site with a arturo.   The subxiphoid site was then closed at the fascial level with 0 Vicryl suture, single, simple, the dermis at that site with 3-0 Vicryl.  Skin at all sites with 5-0 Vicryl.  Exofin applied.    Specimen: gallbladder  EBL: minimal    Graciela Robles MD  01/12/23  11:11 EST      Assistant provided help with exposure, traction, camera holding, suturing, closure and dressings and was critical to a safe and expeditious procedure.

## 2023-01-12 NOTE — ANESTHESIA PREPROCEDURE EVALUATION
Anesthesia Evaluation     Patient summary reviewed and Nursing notes reviewed   history of anesthetic complications: PONV  NPO Solid Status: > 6 hours  NPO Liquid Status: > 6 hours           Airway   Mallampati: II  TM distance: <3 FB  Neck ROM: full  Possible difficult intubation  Dental - normal exam     Pulmonary - negative pulmonary ROS and normal exam    breath sounds clear to auscultation  (-) rhonchi, decreased breath sounds, wheezes, rales, stridor  Cardiovascular - negative cardio ROS and normal exam    NYHA Classification: I  Rhythm: regular  Rate: normal    (-) murmur, weak pulses, friction rub, systolic click, carotid bruits, JVD, peripheral edema      Neuro/Psych  (+) psychiatric history Depression and Anxiety,    GI/Hepatic/Renal/Endo    (+) obesity,       Musculoskeletal     (+) back pain, neck pain, radiculopathy  Abdominal  - normal exam    Abdomen: soft.   Substance History - negative use     OB/GYN negative ob/gyn ROS         Other - negative ROS                     Anesthesia Plan    ASA 3     intravenous induction     Anesthetic plan, risks, benefits, and alternatives have been provided, discussed and informed consent has been obtained with: patient.        CODE STATUS:

## 2023-01-13 LAB
LAB AP CASE REPORT: NORMAL
PATH REPORT.FINAL DX SPEC: NORMAL
PATH REPORT.GROSS SPEC: NORMAL

## 2023-01-17 ENCOUNTER — TELEPHONE (OUTPATIENT)
Dept: SURGERY | Facility: CLINIC | Age: 43
End: 2023-01-17
Payer: COMMERCIAL

## 2023-01-17 NOTE — LETTER
January 18, 2023     Patient: Mazin Steve   YOB: 1980       To Whom It May Concern:    The above named patient has been under my continuous care and unable to perform any and all job functions from 01/12/2023 through 01/22/2023.    It is my medical opinion that Mazin Steve may return to work on 01/23/2023 with no restrictions.    Should you have any questions, please do not hesitate to contact my office.    Sincerely,        Graciela Robles MD   General Surgery    CC: No Recipients

## 2023-01-17 NOTE — TELEPHONE ENCOUNTER
Caller: JANELLE CHANG     Relationship to patient: SELF     Best call back number: [phone]    Patient is needing: PT NEEDS A WRITTEN WORK RELEASE FOR WHEN SHE CAN RETURN TO WORK. SHE WOULD LIKE TO COME PICK IT. SO PLEASE GIVE HER A CALL WHEN IT IS READY. PATIENT'S SURGERY WAS 1/12/23. SHE WAS TOLD ABOUT 10 DAYS FOR RECOVERY BEFORE SHE COULD GO BACK TO WORK.

## 2023-01-18 NOTE — TELEPHONE ENCOUNTER
I left the patient a voicemail to return my call to provide the date that she plans on returning to work so that a letter can be typed for her. I provided the clinical number so she could leave a voicemail in the event that we were not available when she called back. 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

## 2023-01-20 NOTE — TELEPHONE ENCOUNTER
Patient left voice mail stating Erika did not have her FMLA papers.  Papers refax to Erika [phone]

## 2023-02-02 DIAGNOSIS — F33.2 SEVERE EPISODE OF RECURRENT MAJOR DEPRESSIVE DISORDER, WITHOUT PSYCHOTIC FEATURES: ICD-10-CM

## 2023-02-02 DIAGNOSIS — F39 UNSPECIFIED MOOD (AFFECTIVE) DISORDER: ICD-10-CM

## 2023-02-02 DIAGNOSIS — F41.1 GENERALIZED ANXIETY DISORDER: ICD-10-CM

## 2023-02-02 PROBLEM — K80.00 CALCULUS OF GALLBLADDER WITH ACUTE CHOLECYSTITIS WITHOUT OBSTRUCTION: Status: RESOLVED | Noted: 2022-07-25 | Resolved: 2023-02-02

## 2023-02-02 PROBLEM — K80.20 GALLSTONES: Status: RESOLVED | Noted: 2022-12-14 | Resolved: 2023-02-02

## 2023-02-02 RX ORDER — OXCARBAZEPINE 300 MG/1
300 TABLET, FILM COATED ORAL NIGHTLY
Qty: 30 TABLET | Refills: 1 | OUTPATIENT
Start: 2023-02-02 | End: 2023-04-03

## 2023-02-02 NOTE — TELEPHONE ENCOUNTER
Anticonvulsants Protocol Passed 02/02/2023 06:08 AM   Protocol Details  No active pregnancy on record    No positive pregnancy test in past 12 months    Visit with authorizing provider in past 12 months or upcoming 30 days    CBC in past 12 months     NEXT APPT WITH PROVIDER  NONE IN EPIC

## 2023-02-02 NOTE — TELEPHONE ENCOUNTER
ATTEMPTED TO CONTACT PT      NO ANSWER      LEFT VOICEMAIL WITH INSTRUCTIONS TO RETURN CALL TO OFFICE AT [phone]

## 2023-02-02 NOTE — PROGRESS NOTES
SURGERY: [place]  Post op Visit  Mazin Steve  02/06/2023    Ms. Steve  presents today after having undergone Surgery 1/12/23, of a lap chol with xray.  This was for gallstones with bloating.  She had a very unusual gallbladder with initially the appearance of a diverticulum of the gallbladder or an appendage, later seen to be a complete 180 degree twist back of the infundibulum.  Normal cholangiogram with leakage at the insertion site known during the process.  Challenging case due to her habitus requiring unusual entry into the abdomen.    Her official reading of the cholangiogram was negative, path chronic cholecystitis with gallstones.    Today, she comes in saying that she still has a sense of fullness, constipation and no appetite.  She says that she is continuing to lose weight.  She does have a decline from 2018 at 291 to 2022 of 249 but has remained steady there.      She has no family history of colon cancer. Her sister has gastritis.  Patient does not have diabetes to suggest reason for poor gastric emptying.  She takes TUMS for heartburn.  She developed constipation with use of trileptal.  We discussed that this may cause her fullness as well.  She has not tried miralax except with her pain meds post op.  Will do EGD and c scope to evaluate if she desires, or put in GI referral, to help manage her constipation.        Graciela Robles MD  13:11 EST

## 2023-02-06 ENCOUNTER — OFFICE VISIT (OUTPATIENT)
Dept: SURGERY | Facility: CLINIC | Age: 43
End: 2023-02-06
Payer: COMMERCIAL

## 2023-02-06 VITALS — BODY MASS INDEX: 44.12 KG/M2 | HEIGHT: 63 IN | WEIGHT: 249 LBS

## 2023-02-06 DIAGNOSIS — K80.00 CALCULUS OF GALLBLADDER WITH ACUTE CHOLECYSTITIS WITHOUT OBSTRUCTION: Primary | ICD-10-CM

## 2023-02-06 DIAGNOSIS — K80.20 GALLSTONES: ICD-10-CM

## 2023-02-06 PROCEDURE — 99024 POSTOP FOLLOW-UP VISIT: CPT | Performed by: SURGERY

## 2023-02-09 ENCOUNTER — OFFICE VISIT (OUTPATIENT)
Dept: FAMILY MEDICINE CLINIC | Age: 43
End: 2023-02-09
Payer: COMMERCIAL

## 2023-02-09 VITALS
HEIGHT: 63 IN | HEART RATE: 78 BPM | SYSTOLIC BLOOD PRESSURE: 123 MMHG | TEMPERATURE: 98.4 F | WEIGHT: 247 LBS | BODY MASS INDEX: 43.77 KG/M2 | DIASTOLIC BLOOD PRESSURE: 72 MMHG

## 2023-02-09 DIAGNOSIS — F41.8 MIXED ANXIETY AND DEPRESSIVE DISORDER: Primary | ICD-10-CM

## 2023-02-09 DIAGNOSIS — Z12.31 SCREENING MAMMOGRAM FOR BREAST CANCER: ICD-10-CM

## 2023-02-09 DIAGNOSIS — R14.0 BLOATING: ICD-10-CM

## 2023-02-09 PROCEDURE — 99214 OFFICE O/P EST MOD 30 MIN: CPT | Performed by: NURSE PRACTITIONER

## 2023-02-09 RX ORDER — BUSPIRONE HYDROCHLORIDE 10 MG/1
10 TABLET ORAL NIGHTLY
Qty: 90 TABLET | Refills: 1 | Status: SHIPPED | OUTPATIENT
Start: 2023-02-09

## 2023-02-09 RX ORDER — OXCARBAZEPINE 150 MG/1
150 TABLET, FILM COATED ORAL 2 TIMES DAILY
Qty: 180 TABLET | Refills: 1 | Status: SHIPPED | OUTPATIENT
Start: 2023-02-09

## 2023-02-09 NOTE — ASSESSMENT & PLAN NOTE
Offered to schedule with another psych provider, wants to wait on this, continue her current rx's

## 2023-02-09 NOTE — PROGRESS NOTES
Mazin Steve presents to [facility].    Chief Complaint:  Anxiety (Med refill )         History of Present Illness:  Anxiety/ Depression/ mood disorder   Was seeing JOSEPHINE Rouse   Current medication/buspar at higher dose made her too sedated  and trileptal at higher dose constipated her, would like to continue buspar 15 at HS and triliptal 150 mg 2 at night and does not want to see another mental health specialist at this time   Tolerating medication fairly well now   Stressors: work her GI issues   Current symptoms: anxious     PAST MEDICAL HISTORY changes since :              GYNECOLOGICAL HISTORY:    G0    No problems with menstrual cycles.    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   Respiratory: Negative for cough and shortness of breath.    Cardiovascular: Negative for chest pain, palpitations and leg swelling.   Gastrointestinal:        After her gall bladder surgery, having bloating, constipation and would like to see dietitian    Genitourinary: Negative for breast lump (wants her mammogram scheduled ).   Neurological: Negative for numbness.   Psychiatric/Behavioral: Positive for suicidal ideas (thoughts but no plan, denies suicidal thoughts today, wants to stay on same rx and wait on a new referral, see dietary first ).          Current Outpatient Medications:   •  busPIRone (BUSPAR) 10 MG tablet, Take 1 tablet by mouth Every Night., Disp: 90 tablet, Rfl: 1  •   "Homeopathic Products (ALLERGY MEDICINE PO), Take 1 tablet by mouth Daily., Disp: , Rfl:   •  ibuprofen (ADVIL,MOTRIN) 200 MG tablet, Take 200 mg by mouth Every 6 (Six) Hours As Needed for Mild Pain., Disp: , Rfl:   •  OXcarbazepine (Trileptal) 150 MG tablet, Take 1 tablet by mouth 2 (Two) Times a Day., Disp: 180 tablet, Rfl: 1    Vital Signs:   Vitals:    02/09/23 1352   BP: 123/72   BP Location: Left arm   Patient Position: Sitting   Pulse: 78   Temp: 98.4 °F (36.9 °C)   Weight: 112 kg (247 lb)   Height: 160 cm (62.99\")         Physical Exam:  Physical Exam  Vitals reviewed.   Constitutional:       General: She is not in acute distress.     Appearance: Normal appearance.   Cardiovascular:      Rate and Rhythm: Normal rate and regular rhythm.      Heart sounds: Normal heart sounds. No murmur heard.  Pulmonary:      Effort: Pulmonary effort is normal. No respiratory distress.      Breath sounds: Normal breath sounds.   Chest:   Breasts:     Right: Normal. No mass, nipple discharge or skin change.      Left: Normal. No mass, nipple discharge or skin change.   Abdominal:      Palpations: Abdomen is soft.      Tenderness: There is no abdominal tenderness.   Lymphadenopathy:      Upper Body:      Right upper body: No axillary adenopathy.      Left upper body: No axillary adenopathy.   Neurological:      Mental Status: She is alert.   Psychiatric:         Mood and Affect: Mood is anxious.         Behavior: Behavior normal.         Result Review      The following data was reviewed by: RISA Jones on 02/09/2023:    Results for orders placed or performed during the hospital encounter of 01/12/23   POC Pregnancy, Urine    Specimen: Urine   Result Value Ref Range    HCG, Urine, QL Negative Negative    Lot Number [id number]     Internal Positive Control Positive Positive, Passed    Internal Negative Control Negative Negative, Passed    Expiration Date 02/29/2024    Tissue Pathology Exam    Specimen: Gallbladder; " "Tissue   Result Value Ref Range    Case Report       Surgical Pathology Report                         Case: [id number]                                  Authorizing Provider:  Graciela Robles MD         Collected:           01/12/2023 10:26 AM          Ordering Location:     [facility]  Received:            01/12/2023 11:31 AM                                 MAIN OR                                                                      Pathologist:           Deny Jj MD                                                         Specimen:    Gallbladder                                                                                Final Diagnosis       1. Gallbladder, Cholecystectomy:   A. Chronic cholecystitis with cholelithiasis.     katiat/destiny       Gross Description       1. Gallbladder.  Received in formalin labeled with the patient's name and designated \"gallbladder\" is a pink tan intact markedly distended gallbladder measuring 11 x 4.5 x 3.2 cm. The serosal surface is smooth and glistening. The hepatic surface is mildly granular. The cystic duct is occluded by a palpable gallstone. The gallbladder wall is opened longitudinally to reveal approximately 20 cc of clear bilious liquid. Multiple irregular tan to yellow calculous stones are found within the lumen measuring 5.5 x 4.8 x 0.6 cm in aggregate. The gallbladder wall is uniformly thin and pliable measuring 0.1 cm in  thickness. The lumen is lined by pink tan trabecular mucosa. No mass or polyp formation is identified. Representative sections of cystic duct, body, and fundus are submitted in 1A.    mb/o/jaqueline/yasmin                      Assessment and Plan:          Diagnoses and all orders for this visit:    1. Mixed anxiety and depressive disorder (Primary)  Assessment & Plan:  Offered to schedule with another psych provider, wants to wait on this, continue her current rx's     Orders:  -     OXcarbazepine (Trileptal) 150 MG tablet; Take 1 " tablet by mouth 2 (Two) Times a Day.  Dispense: 180 tablet; Refill: 1  -     busPIRone (BUSPAR) 10 MG tablet; Take 1 tablet by mouth Every Night.  Dispense: 90 tablet; Refill: 1    2. Screening mammogram for breast cancer  Assessment & Plan:  Continue to check monthly BSE, set up mammogram (last mammogram 1-17-22 benign)     Orders:  -     Mammo Screening Digital Tomosynthesis Bilateral With CAD; Future    3. Bloating  Assessment & Plan:  Set up dietary consult appt     Orders:  -     Ambulatory Referral to Nutrition Services        Follow Up   No follow-ups on file.  Patient was given instructions and counseling regarding her condition or for health maintenance advice. Please see specific information pulled into the AVS if appropriate.

## 2023-04-13 ENCOUNTER — HOSPITAL ENCOUNTER (OUTPATIENT)
Dept: MAMMOGRAPHY | Facility: HOSPITAL | Age: 43
Discharge: HOME | End: 2023-04-13
Admitting: NURSE PRACTITIONER
Payer: COMMERCIAL

## 2023-04-13 DIAGNOSIS — Z12.31 SCREENING MAMMOGRAM FOR BREAST CANCER: ICD-10-CM

## 2023-04-13 PROCEDURE — 77067 SCR MAMMO BI INCL CAD: CPT

## 2023-04-13 PROCEDURE — 77063 BREAST TOMOSYNTHESIS BI: CPT

## 2023-05-08 ENCOUNTER — OFFICE VISIT (OUTPATIENT)
Dept: FAMILY MEDICINE CLINIC | Age: 43
End: 2023-05-08
Payer: COMMERCIAL

## 2023-05-08 VITALS
HEIGHT: 63 IN | OXYGEN SATURATION: 100 % | SYSTOLIC BLOOD PRESSURE: 123 MMHG | DIASTOLIC BLOOD PRESSURE: 71 MMHG | WEIGHT: 256 LBS | TEMPERATURE: 97.5 F | BODY MASS INDEX: 45.36 KG/M2 | HEART RATE: 74 BPM
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
## 2023-05-08 DIAGNOSIS — R21 RASH: ICD-10-CM

## 2023-05-08 RX ORDER — PERMETHRIN 50 MG/G
1 CREAM TOPICAL ONCE
Qty: 60 G | Refills: 0 | Status: SHIPPED | OUTPATIENT
Start: 2023-05-08 | End: 2023-05-11

## 2023-05-08 RX ORDER — FAMOTIDINE 20 MG/1
20 TABLET, FILM COATED ORAL 2 TIMES DAILY
Qty: 14 TABLET | Refills: 0 | Status: SHIPPED | OUTPATIENT
Start: 2023-05-08

## 2023-05-08 NOTE — PROGRESS NOTES
"Subjective     CHIEF COMPLAINT    Chief Complaint   Patient presents with   • red itchy spots on legs and back      Thinks it may be Turkey Mites      History of Present Illness  Patient is a 43-year-old female who presents to the clinic today with complaints of a rash.  She states that she has itchy spots to the back of her right knee and right ankle. Her lower back is also somewhat itchy. She has not tried anything at home for her symptoms.  She is outside a lot in tall weeds and is concerned it may be turkey mites or some other bug bites.  She has had this before.  She has not seen any bugs and has looked closely.  She reports that she cannot take Benadryl for her symptoms as she has had bad reactions in the past.  Denies any new exposures to soaps, lotions or detergents    Review of Systems   Constitutional: Negative for chills and fever.   Respiratory: Negative for shortness of breath and wheezing.    Cardiovascular: Negative for chest pain.   Gastrointestinal: Negative for nausea and vomiting.   Skin: Positive for rash.     Allergies   Allergen Reactions   • Latex Other (See Comments)     Contact dermatitis     Current Outpatient Medications on File Prior to Visit   Medication Sig Dispense Refill   • busPIRone (BUSPAR) 10 MG tablet Take 1 tablet by mouth Every Night. 90 tablet 1   • Homeopathic Products (ALLERGY MEDICINE PO) Take 1 tablet by mouth Daily.     • ibuprofen (ADVIL,MOTRIN) 200 MG tablet Take 1 tablet by mouth Every 6 (Six) Hours As Needed for Mild Pain.     • OXcarbazepine (Trileptal) 150 MG tablet Take 1 tablet by mouth 2 (Two) Times a Day. 180 tablet 1     No current facility-administered medications on file prior to visit.     /71 (BP Location: Left arm, Patient Position: Sitting, Cuff Size: Large Adult)   Pulse 74   Temp 97.5 °F (36.4 °C) (Oral)   Ht 160 cm (62.99\")   Wt 116 kg (256 lb)   LMP 04/10/2023   SpO2 100%   BMI 45.36 kg/m²     Objective     Physical Exam  Vitals and " nursing note reviewed.   Constitutional:       General: She is not in acute distress.     Appearance: Normal appearance. She is not ill-appearing.   Cardiovascular:      Rate and Rhythm: Normal rate and regular rhythm.      Heart sounds: Normal heart sounds. No murmur heard.  Pulmonary:      Effort: Pulmonary effort is normal. No accessory muscle usage or respiratory distress.      Breath sounds: Normal breath sounds. No wheezing or rhonchi.   Musculoskeletal:        Back:       Comments: 3 small ticks attached to back in areas noted above. No bullseye rashes, erythema or concerning findings noted. Ticks were not engorged.    Skin:     General: Skin is warm and dry.      Comments: Few scattered erythematous papules noted to right ankle. No visible rash located to the back of the right knee.    Neurological:      General: No focal deficit present.      Mental Status: She is alert and oriented to person, place, and time.   Psychiatric:         Mood and Affect: Mood and affect normal.         Behavior: Behavior normal.       Foreign Body Removal    Date/Time: 5/8/2023 2:30 PM  Performed by: Kiara Mason APRN  Authorized by: Kiara Mason APRN   Body area: skin  General location: trunk  Location details: back    Sedation:  Patient sedated: no    Patient restrained: no  Patient cooperative: yes  Complexity: simple  3 objects recovered.  Objects recovered: small ticks   Post-procedure assessment: foreign body removed  Patient tolerance: patient tolerated the procedure well with no immediate complications  Comments: 3 small ticks removed from back with sterile tweezers, see image in physical exam for location. Entire tick removed, no complications. Areas were cleansed with alcohol prior to removal.          Diagnoses and all orders for this visit:    1. Tick bite with subsequent removal of tick (Primary)  -     Foreign Body Removal    2. Rash  -     famotidine (Pepcid) 20 MG tablet; Take 1 tablet by mouth 2 (Two)  Times a Day.  Dispense: 14 tablet; Refill: 0  -     triamcinolone (KENALOG) 0.1 % ointment; Apply topically to the appropriate area as directed 2 (Two) Times a Day.  Dispense: 30 g; Refill: 0  -     permethrin (ELIMITE) 5 % cream; Apply to all areas of the body from the neck to soles of the feet; leave on for 8-14 hours before removing by washing (shower or bath).  Dispense: 60 g; Refill: 0    Other orders  -     Cancel: Foreign Body Removal      3 ticks were successfully removed from patients back with no complications. None were engorged. She is outside frequently so she likely has some sort of bug bites to her ankles and the backs of her knees. We will cover her with some permethrin cream. I also sent in a prescription for kenalog cream and Pepcid and she was instructed to continue taking a daily antihistamine. Recommend cool compresses, monitor tick bite sites for any rashes. Return to clinic if symptoms worsen or do not improve.     Follow up:  Return if symptoms worsen or fail to improve.  Patient was given instructions and counseling regarding her condition or for health maintenance advice. Please see specific information pulled into the AVS if appropriate.

## 2023-08-03 DIAGNOSIS — F41.8 MIXED ANXIETY AND DEPRESSIVE DISORDER: ICD-10-CM

## 2023-08-03 RX ORDER — BUSPIRONE HYDROCHLORIDE 10 MG/1
10 TABLET ORAL NIGHTLY
Qty: 30 TABLET | Refills: 0 | Status: SHIPPED | OUTPATIENT
Start: 2023-08-03

## 2023-08-03 RX ORDER — OXCARBAZEPINE 150 MG/1
150 TABLET, FILM COATED ORAL 2 TIMES DAILY
Qty: 60 TABLET | Refills: 0 | Status: SHIPPED | OUTPATIENT
Start: 2023-08-03

## 2023-09-06 DIAGNOSIS — F41.8 MIXED ANXIETY AND DEPRESSIVE DISORDER: ICD-10-CM

## 2023-09-06 RX ORDER — OXCARBAZEPINE 150 MG/1
150 TABLET, FILM COATED ORAL 2 TIMES DAILY
Qty: 60 TABLET | Refills: 0 | OUTPATIENT
Start: 2023-09-06

## 2023-09-06 RX ORDER — BUSPIRONE HYDROCHLORIDE 10 MG/1
10 TABLET ORAL NIGHTLY
Qty: 30 TABLET | Refills: 0 | OUTPATIENT
Start: 2023-09-06

## 2023-09-11 ENCOUNTER — OFFICE VISIT (OUTPATIENT)
Dept: FAMILY MEDICINE CLINIC | Age: 43
End: 2023-09-11
Payer: COMMERCIAL

## 2023-09-11 ENCOUNTER — LAB (OUTPATIENT)
Dept: LAB | Facility: HOSPITAL | Age: 43
End: 2023-09-11
Payer: COMMERCIAL

## 2023-09-11 VITALS
DIASTOLIC BLOOD PRESSURE: 67 MMHG | WEIGHT: 258 LBS | HEART RATE: 79 BPM | BODY MASS INDEX: 45.71 KG/M2 | TEMPERATURE: 98.1 F | SYSTOLIC BLOOD PRESSURE: 132 MMHG | HEIGHT: 63 IN

## 2023-09-11 DIAGNOSIS — F41.8 MIXED ANXIETY AND DEPRESSIVE DISORDER: Primary | ICD-10-CM

## 2023-09-11 DIAGNOSIS — R21 RASH: ICD-10-CM

## 2023-09-11 DIAGNOSIS — Z11.59 SCREENING FOR VIRAL DISEASE: ICD-10-CM

## 2023-09-11 DIAGNOSIS — F41.8 MIXED ANXIETY AND DEPRESSIVE DISORDER: ICD-10-CM

## 2023-09-11 LAB
ALBUMIN SERPL-MCNC: 4.1 G/DL (ref 3.5–5.2)
ALBUMIN/GLOB SERPL: 1.5 G/DL
ALP SERPL-CCNC: 74 U/L (ref 39–117)
ALT SERPL W P-5'-P-CCNC: 10 U/L (ref 1–33)
ANION GAP SERPL CALCULATED.3IONS-SCNC: 11.8 MMOL/L (ref 5–15)
AST SERPL-CCNC: 17 U/L (ref 1–32)
BASOPHILS # BLD AUTO: 0.04 10*3/MM3 (ref 0–0.2)
BASOPHILS NFR BLD AUTO: 0.4 % (ref 0–1.5)
BILIRUB SERPL-MCNC: 0.3 MG/DL (ref 0–1.2)
BUN SERPL-MCNC: 11 MG/DL (ref 6–20)
BUN/CREAT SERPL: 12.6 (ref 7–25)
CALCIUM SPEC-SCNC: 8.9 MG/DL (ref 8.6–10.5)
CHLORIDE SERPL-SCNC: 105 MMOL/L (ref 98–107)
CO2 SERPL-SCNC: 25.2 MMOL/L (ref 22–29)
CREAT SERPL-MCNC: 0.87 MG/DL (ref 0.57–1)
DEPRECATED RDW RBC AUTO: 41.4 FL (ref 37–54)
EGFRCR SERPLBLD CKD-EPI 2021: 84.4 ML/MIN/1.73
EOSINOPHIL # BLD AUTO: 0.26 10*3/MM3 (ref 0–0.4)
EOSINOPHIL NFR BLD AUTO: 2.4 % (ref 0.3–6.2)
ERYTHROCYTE [DISTWIDTH] IN BLOOD BY AUTOMATED COUNT: 13.1 % (ref 12.3–15.4)
GLOBULIN UR ELPH-MCNC: 2.7 GM/DL
GLUCOSE SERPL-MCNC: 93 MG/DL (ref 65–99)
HCT VFR BLD AUTO: 34.3 % (ref 34–46.6)
HGB BLD-MCNC: 11.1 G/DL (ref 12–15.9)
IMM GRANULOCYTES # BLD AUTO: 0.02 10*3/MM3 (ref 0–0.05)
IMM GRANULOCYTES NFR BLD AUTO: 0.2 % (ref 0–0.5)
LYMPHOCYTES # BLD AUTO: 2.81 10*3/MM3 (ref 0.7–3.1)
LYMPHOCYTES NFR BLD AUTO: 25.7 % (ref 19.6–45.3)
MCH RBC QN AUTO: 27.5 PG (ref 26.6–33)
MCHC RBC AUTO-ENTMCNC: 32.4 G/DL (ref 31.5–35.7)
MCV RBC AUTO: 85.1 FL (ref 79–97)
MONOCYTES # BLD AUTO: 1.02 10*3/MM3 (ref 0.1–0.9)
MONOCYTES NFR BLD AUTO: 9.3 % (ref 5–12)
NEUTROPHILS NFR BLD AUTO: 6.78 10*3/MM3 (ref 1.7–7)
NEUTROPHILS NFR BLD AUTO: 62 % (ref 42.7–76)
PLATELET # BLD AUTO: 304 10*3/MM3 (ref 140–450)
PMV BLD AUTO: 8.1 FL (ref 6–12)
POTASSIUM SERPL-SCNC: 4.5 MMOL/L (ref 3.5–5.2)
PROT SERPL-MCNC: 6.8 G/DL (ref 6–8.5)
RBC # BLD AUTO: 4.03 10*6/MM3 (ref 3.77–5.28)
SODIUM SERPL-SCNC: 142 MMOL/L (ref 136–145)
WBC NRBC COR # BLD: 10.93 10*3/MM3 (ref 3.4–10.8)

## 2023-09-11 PROCEDURE — 80053 COMPREHEN METABOLIC PANEL: CPT

## 2023-09-11 PROCEDURE — 85025 COMPLETE CBC W/AUTO DIFF WBC: CPT

## 2023-09-11 PROCEDURE — 86803 HEPATITIS C AB TEST: CPT

## 2023-09-11 PROCEDURE — 36415 COLL VENOUS BLD VENIPUNCTURE: CPT

## 2023-09-11 PROCEDURE — 99213 OFFICE O/P EST LOW 20 MIN: CPT | Performed by: NURSE PRACTITIONER

## 2023-09-11 RX ORDER — OXCARBAZEPINE 150 MG/1
150 TABLET, FILM COATED ORAL 2 TIMES DAILY
Qty: 180 TABLET | Refills: 1 | Status: SHIPPED | OUTPATIENT
Start: 2023-09-11

## 2023-09-11 RX ORDER — SODIUM FLUORIDE AND POTASSIUM NITRATE 5.8; 57.5 MG/ML; MG/ML
GEL, DENTIFRICE DENTAL
COMMUNITY
Start: 2023-05-18

## 2023-09-11 RX ORDER — BUSPIRONE HYDROCHLORIDE 10 MG/1
10 TABLET ORAL NIGHTLY
Qty: 90 TABLET | Refills: 1 | Status: SHIPPED | OUTPATIENT
Start: 2023-09-11

## 2023-09-11 NOTE — PROGRESS NOTES
Chief Complaint  Med Refill (Medication refills on buspirone and oxycarbazepine)    Subjective          Mazin Steve presents to [facility]    History of Present Illness  Anxiety/ Depression  Current medication/buspar and oxcarbazepine   Tolerating medication Yes  Stressors: work / works at 5 am 5 days a week, has a new pet  Current symptoms: today a good day but she varies, does not like higher doses of rx due to side effects, rx helps     PAST MEDICAL HISTORY changes since :               GYNECOLOGICAL HISTORY:    G0    No problems with menstrual cycles.    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     Marital Status: Single/lives alone with her cats      Children: None                  Past Medical History:   Diagnosis Date    Abdominal pain     Anxiety     Back pain     Constipation     Depression     Gallstones     Herniated disc, cervical     Joint pain     PONV (postoperative nausea and vomiting)     Self-injurious behavior        Allergies   Allergen Reactions    Latex Other (See Comments)     Contact dermatitis        Past Surgical History:   Procedure Laterality Date    CHOLECYSTECTOMY WITH INTRAOPERATIVE CHOLANGIOGRAM N/A 2023    Procedure: laparoscopic cholecystectomy with cholangiogram;  Surgeon: Graciela Robles MD;  Location: [facility];  Service: General;  Laterality: N/A;    WISDOM TOOTH EXTRACTION          Social History     Tobacco Use    Smoking status: Never     Passive exposure: Never    Smokeless tobacco: Never   Substance Use Topics    Alcohol use: Not Currently       Family History   Problem  Relation Age of Onset    Depression Mother     Anxiety disorder Mother     Seizures Father     Depression Father     Gallbladder disease Sister     Gallbladder disease Brother     Alcohol abuse Maternal Grandfather     Malig Hyperthermia Neg Hx         Health Maintenance Due   Topic Date Due    TDAP/TD VACCINES (1 - Tdap) Never done    HEPATITIS C SCREENING  Never done    ANNUAL PHYSICAL  Never done    PAP SMEAR  Never done    COVID-19 Vaccine (2 - Moderna series) 06/03/2022    BMI FOLLOWUP  07/14/2023        Current Outpatient Medications on File Prior to Visit   Medication Sig    Homeopathic Products (ALLERGY MEDICINE PO) Take 1 tablet by mouth Daily.    ibuprofen (ADVIL,MOTRIN) 200 MG tablet Take 1 tablet by mouth Every 6 (Six) Hours As Needed for Mild Pain.    PreviDent 5000 Enamel Protect 1.1-5 % gel BRUSH WITH PASTE DAILY AT BEDTIME, SPIT OUT EXCESS, DO NOT EAT, RINSE, OR DRINK FOR ONE HOUR AFTER USING    [DISCONTINUED] busPIRone (BUSPAR) 10 MG tablet Take 1 tablet by mouth Every Night. DUE FOR FOLLOW UP OFFICE VISIT FOR REFILLS    [DISCONTINUED] OXcarbazepine (Trileptal) 150 MG tablet Take 1 tablet by mouth 2 (Two) Times a Day. DUE FOR FOLLOW UP OFFICE VISIT FOR REFILLS    [DISCONTINUED] famotidine (Pepcid) 20 MG tablet Take 1 tablet by mouth 2 (Two) Times a Day. (Patient not taking: Reported on 9/11/2023)    [DISCONTINUED] triamcinolone (KENALOG) 0.1 % ointment Apply topically to the appropriate area as directed 2 (Two) Times a Day. (Patient not taking: Reported on 9/11/2023)     No current facility-administered medications on file prior to visit.       Immunization History   Administered Date(s) Administered    COVID-19 (MODERNA) 1st,2nd,3rd Dose Monovalent 04/08/2022    COVID-19 (MODERNA) Monovalent Original Booster 01/07/2021, 02/02/2021    Flucelvax Quad Vial =>4yrs 10/21/2021    Fluzone >6mos 10/31/2022    Hepatitis A 06/04/2018    Influenza Seasonal Injectable 10/01/2016    Influenza, Unspecified  "10/01/2016       Review of Systems   Constitutional:  Negative for fatigue and fever.   Respiratory:  Negative for cough and shortness of breath.    Cardiovascular:  Negative for chest pain, palpitations and leg swelling.   Skin:  Positive for rash (patch on her neck 3 weeks, can't see).      Objective     Vitals:    09/11/23 1404   BP: 132/67   BP Location: Right arm   Patient Position: Sitting   Cuff Size: Large Adult   Pulse: 79   Temp: 98.1 °F (36.7 °C)   TempSrc: Oral   Weight: 117 kg (258 lb)   Height: 160 cm (62.99\")            Physical Exam  Vitals reviewed.   Constitutional:       General: She is not in acute distress.     Appearance: Normal appearance.   Cardiovascular:      Rate and Rhythm: Normal rate and regular rhythm.      Heart sounds: Normal heart sounds. No murmur heard.  Pulmonary:      Effort: Pulmonary effort is normal. No respiratory distress.      Breath sounds: Normal breath sounds.   Skin:     Findings: Rash (tiny macularpapular area dime size on left mid to lateral neck, flesh colored with no exudate or erythema) present.   Neurological:      Mental Status: She is alert.   Psychiatric:         Mood and Affect: Mood normal.         Behavior: Behavior normal.       Result Review :     The following data was reviewed by: RISA Jones on 09/11/2023:                       Assessment and Plan      Diagnoses and all orders for this visit:    1. Mixed anxiety and depressive disorder (Primary)  Assessment & Plan:  continue current rx's, rechecking some labs today ; she did ask about covid booster, to get when the updated version is released next month     Orders:  -     Comprehensive metabolic panel; Future  -     CBC w AUTO Differential; Future  -     busPIRone (BUSPAR) 10 MG tablet; Take 1 tablet by mouth Every Night.  Dispense: 90 tablet; Refill: 1  -     OXcarbazepine (Trileptal) 150 MG tablet; Take 1 tablet by mouth 2 (Two) Times a Day.  Dispense: 180 tablet; Refill: 1    2. " Screening for viral disease  Assessment & Plan:  Screen for hep c    Orders:  -     Hepatitis C antibody; Future    3. Rash  Assessment & Plan:  Observe, follow up if increases, persist                      Follow Up     Return for followup pending lab results.    Patient was given instructions and counseling regarding her condition or for health maintenance advice. Please see specific information pulled into the AVS if appropriate.

## 2023-09-11 NOTE — ASSESSMENT & PLAN NOTE
continue current rx's, rechecking some labs today ; she did ask about covid booster, to get when the updated version is released next month

## 2023-09-12 LAB — HCV AB SER DONR QL: NORMAL

## 2024-01-29 ENCOUNTER — HOSPITAL ENCOUNTER (OUTPATIENT)
Dept: GENERAL RADIOLOGY | Facility: HOSPITAL | Age: 44
Discharge: HOME | End: 2024-01-29
Admitting: NURSE PRACTITIONER
Payer: COMMERCIAL

## 2024-01-29 ENCOUNTER — OFFICE VISIT (OUTPATIENT)
Dept: FAMILY MEDICINE CLINIC | Age: 44
End: 2024-01-29
Payer: COMMERCIAL

## 2024-01-29 VITALS
HEIGHT: 63 IN | HEART RATE: 78 BPM | BODY MASS INDEX: 48.2 KG/M2 | DIASTOLIC BLOOD PRESSURE: 90 MMHG | WEIGHT: 272 LBS | TEMPERATURE: 97.7 F | SYSTOLIC BLOOD PRESSURE: 134 MMHG

## 2024-01-29 DIAGNOSIS — M25.561 PAIN IN BOTH KNEES, UNSPECIFIED CHRONICITY: Primary | ICD-10-CM

## 2024-01-29 DIAGNOSIS — M25.562 ACUTE PAIN OF LEFT KNEE: ICD-10-CM

## 2024-01-29 DIAGNOSIS — M25.562 PAIN IN BOTH KNEES, UNSPECIFIED CHRONICITY: Primary | ICD-10-CM

## 2024-01-29 DIAGNOSIS — F41.8 MIXED ANXIETY AND DEPRESSIVE DISORDER: ICD-10-CM

## 2024-01-29 DIAGNOSIS — M25.562 ACUTE PAIN OF LEFT KNEE: Primary | ICD-10-CM

## 2024-01-29 DIAGNOSIS — M17.12 OSTEOARTHRITIS OF LEFT KNEE, UNSPECIFIED OSTEOARTHRITIS TYPE: ICD-10-CM

## 2024-01-29 PROCEDURE — 99214 OFFICE O/P EST MOD 30 MIN: CPT | Performed by: NURSE PRACTITIONER

## 2024-01-29 PROCEDURE — 73562 X-RAY EXAM OF KNEE 3: CPT

## 2024-01-29 RX ORDER — BUSPIRONE HYDROCHLORIDE 10 MG/1
10 TABLET ORAL NIGHTLY
Qty: 90 TABLET | Refills: 1 | Status: SHIPPED | OUTPATIENT
Start: 2024-01-29

## 2024-01-29 RX ORDER — OXCARBAZEPINE 150 MG/1
150 TABLET, FILM COATED ORAL 2 TIMES DAILY
Qty: 180 TABLET | Refills: 1 | Status: SHIPPED | OUTPATIENT
Start: 2024-01-29

## 2024-01-29 NOTE — PROGRESS NOTES
Chief Complaint  Knee Pain (Knee pain in both knees since dec 3. Left knee is worse than right. She fell December 3. )    Subjective          Mazin Steve presents to [facility]    History of Present Illness  Joint pain:   Knee pains  Symptoms started: December 3, she had a fall   associated symptoms: bilateral knee pain, L>R, swelling last week left side (did start exercising recently and it flared her knee)   Treatment tried: Ibuprofen occ, not helping now   Dg testing : none   (Dog knocked her down and she hit her wooden deck, landed on the left knee first)     Needs her triliptal and buspar refilled, overall works.     PAST MEDICAL HISTORY changes since :               GYNECOLOGICAL HISTORY:    G0    No problems with menstrual cycles.    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     Marital Status: Single/lives alone with her cats      Children: None           Past Medical History:   Diagnosis Date    Abdominal pain     Anxiety     Back pain     Constipation     Depression     Gallstones     Herniated disc, cervical     Joint pain     PONV (postoperative nausea and vomiting)     Self-injurious behavior        Allergies   Allergen Reactions    Latex Other (See Comments)     Contact dermatitis        Past Surgical History:   Procedure Laterality Date    CHOLECYSTECTOMY WITH INTRAOPERATIVE CHOLANGIOGRAM N/A 2023    Procedure: laparoscopic cholecystectomy with cholangiogram;  Surgeon: Graciela Robles MD;  Location: [facility];  Service: General;  Laterality: N/A;    WISDOM TOOTH EXTRACTION          Social History      Tobacco Use    Smoking status: Never     Passive exposure: Never    Smokeless tobacco: Never   Substance Use Topics    Alcohol use: Not Currently       Family History   Problem Relation Age of Onset    Depression Mother     Anxiety disorder Mother     Seizures Father     Depression Father     Gallbladder disease Sister     Gallbladder disease Brother     Alcohol abuse Maternal Grandfather     Malig Hyperthermia Neg Hx         Health Maintenance Due   Topic Date Due    TDAP/TD VACCINES (1 - Tdap) Never done    ANNUAL PHYSICAL  Never done    PAP SMEAR  Never done    COVID-19 Vaccine (4 - 2023-24 season) 09/01/2023        Current Outpatient Medications on File Prior to Visit   Medication Sig    Homeopathic Products (ALLERGY MEDICINE PO) Take 1 tablet by mouth Daily.    ibuprofen (ADVIL,MOTRIN) 200 MG tablet Take 1 tablet by mouth Every 6 (Six) Hours As Needed for Mild Pain.    PreviDent 5000 Enamel Protect 1.1-5 % gel BRUSH WITH PASTE DAILY AT BEDTIME, SPIT OUT EXCESS, DO NOT EAT, RINSE, OR DRINK FOR ONE HOUR AFTER USING    [DISCONTINUED] busPIRone (BUSPAR) 10 MG tablet Take 1 tablet by mouth Every Night.    [DISCONTINUED] OXcarbazepine (Trileptal) 150 MG tablet Take 1 tablet by mouth 2 (Two) Times a Day.     No current facility-administered medications on file prior to visit.       Immunization History   Administered Date(s) Administered    COVID-19 (MODERNA) 1st,2nd,3rd Dose Monovalent 04/08/2022    COVID-19 (MODERNA) Monovalent Original Booster 01/07/2021, 02/02/2021    Flublok 18+yrs 10/06/2023    Flucelvax Quad Vial =>4yrs 10/21/2021    Fluzone (or Fluarix & Flulaval for VFC) >6mos 10/31/2022    Hepatitis A 06/04/2018    Influenza Seasonal Injectable 10/01/2016    Influenza, Unspecified 10/01/2016       Review of Systems   Constitutional:  Negative for fatigue and fever.   Respiratory:  Negative for cough and shortness of breath.    Cardiovascular:  Negative for chest pain, palpitations and leg swelling.     "    Objective     Vitals:    01/29/24 1117 01/29/24 1146   BP: 132/98 134/90   BP Location: Left arm Right arm   Patient Position: Sitting Sitting   Cuff Size: Large Adult Large Adult   Pulse: 87 78   Temp: 97.7 °F (36.5 °C)    TempSrc: Oral    Weight: 123 kg (272 lb)    Height: 160 cm (62.99\")             Physical Exam  Vitals reviewed.   Constitutional:       General: She is not in acute distress.     Appearance: Normal appearance.   Neck:      Vascular: No carotid bruit.   Cardiovascular:      Rate and Rhythm: Normal rate and regular rhythm.      Heart sounds: Normal heart sounds. No murmur heard.  Pulmonary:      Effort: Pulmonary effort is normal. No respiratory distress.      Breath sounds: Normal breath sounds.   Musculoskeletal:         General: Tenderness (to touch of right knee; right knee full ROM without pain; left knee slightly swollen, no redness to superior knee and pain with flexion of knee) present.      Right lower leg: No edema.      Left lower leg: No edema.   Neurological:      Mental Status: She is alert.   Psychiatric:         Mood and Affect: Mood normal.         Behavior: Behavior normal.         Result Review :     The following data was reviewed by: RISA Jones on 01/29/2024:                       Assessment and Plan      Diagnoses and all orders for this visit:    1. Acute pain of left knee (Primary)  Assessment & Plan:  Send for x-ray and then will advise further treatment/evaluation     Orders:  -     XR Knee 3 View Left; Future    2. Mixed anxiety and depressive disorder  Assessment & Plan:  Discussed her rx's, reviewed last lab, discussed sending her to Dr Shaikh, she wants to continue current rx's and see how she does     Orders:  -     busPIRone (BUSPAR) 10 MG tablet; Take 1 tablet by mouth Every Night.  Dispense: 90 tablet; Refill: 1  -     OXcarbazepine (Trileptal) 150 MG tablet; Take 1 tablet by mouth 2 (Two) Times a Day.  Dispense: 180 tablet; Refill: 1            "       Follow Up     Return if symptoms worsen or fail to improve, for follow up pending x-ray result.    Patient was given instructions and counseling regarding her condition or for health maintenance advice. Please see specific information pulled into the AVS if appropriate.

## 2024-01-29 NOTE — ASSESSMENT & PLAN NOTE
Discussed her rx's, reviewed last lab, discussed sending her to Dr Shaikh, she wants to continue current rx's and see how she does

## 2024-02-14 ENCOUNTER — OFFICE VISIT (OUTPATIENT)
Dept: ORTHOPEDIC SURGERY | Facility: CLINIC | Age: 44
End: 2024-02-14
Payer: COMMERCIAL

## 2024-02-14 VITALS — BODY MASS INDEX: 48.2 KG/M2 | HEIGHT: 63 IN | HEART RATE: 76 BPM | WEIGHT: 272 LBS | OXYGEN SATURATION: 98 %

## 2024-02-14 DIAGNOSIS — M17.12 PRIMARY OSTEOARTHRITIS OF LEFT KNEE: Primary | ICD-10-CM

## 2024-03-27 ENCOUNTER — OFFICE VISIT (OUTPATIENT)
Dept: ORTHOPEDIC SURGERY | Facility: CLINIC | Age: 44
End: 2024-03-27
Payer: COMMERCIAL

## 2024-03-27 VITALS — HEART RATE: 80 BPM | OXYGEN SATURATION: 99 %

## 2024-03-27 DIAGNOSIS — M17.12 PRIMARY OSTEOARTHRITIS OF LEFT KNEE: Primary | ICD-10-CM

## 2024-03-27 RX ORDER — MELOXICAM 7.5 MG/1
7.5 TABLET ORAL DAILY
Qty: 30 TABLET | Refills: 0 | Status: SHIPPED | OUTPATIENT
Start: 2024-03-27

## 2024-03-27 RX ADMIN — LIDOCAINE HYDROCHLORIDE 5 ML: 10 INJECTION, SOLUTION INFILTRATION; PERINEURAL at 14:18

## 2024-03-27 RX ADMIN — TRIAMCINOLONE ACETONIDE 40 MG: 40 INJECTION, SUSPENSION INTRA-ARTICULAR; INTRAMUSCULAR at 14:18

## 2024-03-27 NOTE — PROGRESS NOTES
Chief Complaint  Follow-up of the Left Knee    Subjective          Mazin Steve presents to [facility]   History of Present Illness    Mazin Steve presents today for a follow-up of her left knee.  Patient has left knee arthritis that we are treating conservatively.  Today, patient reports fluctuations in her pain.  She does report burning sensation at times.  She was taking diclofenac but reports it was giving her reflux.  She explains that her knee pain does interfere with her daily living.      Allergies   Allergen Reactions    Latex Other (See Comments)     Contact dermatitis        Social History     Socioeconomic History    Marital status: Single   Tobacco Use    Smoking status: Never     Passive exposure: Never    Smokeless tobacco: Never   Vaping Use    Vaping status: Never Used   Substance and Sexual Activity    Alcohol use: Not Currently    Drug use: Never    Sexual activity: Not Currently        I reviewed the patient's chief complaint, history of present illness, review of systems, past medical history, surgical history, family history, social history, medications, and allergy list.     REVIEW OF SYSTEMS    Constitutional: Denies fevers, chills, weight loss  Cardiovascular: Denies chest pain, shortness of breath  Skin: Denies rashes, acute skin changes  Neurologic: Denies headache, loss of consciousness  MSK: Left knee pain      Objective   Vital Signs:   Pulse 80   SpO2 99%     There is no height or weight on file to calculate BMI.    Physical Exam    General: Alert. No acute distress.   Left lower extremity: Nontender to medial or lateral joint lines.  Full knee extension.  Knee flexion 100.  Patellar crepitus with motion.  Knee extensor mechanism intact.  Knee stable to varus and valgus stress.  Calf soft, nontender.  Demonstrates active ankle plantarflexion dorsiflexion.  Sensation intact over dorsal and plantar foot.  Palpable pedal pulses.    Large Joint  Arthrocentesis  Date/Time: 3/27/2024 2:18 PM  Consent given by: patient  Site marked: site marked  Timeout: Immediately prior to procedure a time out was called to verify the correct patient, procedure, equipment, support staff and site/side marked as required   Supporting Documentation  Indications: pain   Procedure Details  Location: -   Location: LEFT KNEE.Needle gauge: 21G.  Medications administered: 5 mL lidocaine 1 %; 40 mg triamcinolone acetonide 40 MG/ML  Patient tolerance: patient tolerated the procedure well with no immediate complications        Imaging Results (Most Recent)       None                   Assessment and Plan    Diagnoses and all orders for this visit:    1. Primary osteoarthritis of left knee (Primary)  -     Large Joint Arthrocentesis  -     meloxicam (Mobic) 7.5 MG tablet; Take 1 tablet by mouth Daily.  Dispense: 30 tablet; Refill: 0        Mazin L Power presents today for follow-up of her left knee arthritis that we are treating conservatively.  Patient instructed to continue with her home exercises.  Continue with activities as tolerated.  Use ice elevation as needed for inflammation. We discussed the risks and benefits with the patient regarding left knee steroid injection. Patient understood and elected to proceed. Patient tolerated injection well with no complications.  Mobic was ordered for the patient today.  She is instructed to discontinue the diclofenac and not take any other anti-inflammatories with this medication.  Patient expressed understanding.        Patient will follow up in 6 weeks for reevaluation.       Call or return if symptoms worsen or patient has any concerns.       Follow Up   Return in about 6 weeks (around 5/8/2024).  Patient was given instructions and counseling regarding her condition or for health maintenance advice. Please see specific information pulled into the AVS if appropriate.     Yesenia Mata PA-C  03/29/24  08:03 EDT

## 2024-03-29 RX ORDER — TRIAMCINOLONE ACETONIDE 40 MG/ML
40 INJECTION, SUSPENSION INTRA-ARTICULAR; INTRAMUSCULAR
Status: COMPLETED | OUTPATIENT
Start: 2024-03-27 | End: 2024-03-27

## 2024-03-29 RX ORDER — LIDOCAINE HYDROCHLORIDE 10 MG/ML
5 INJECTION, SOLUTION INFILTRATION; PERINEURAL
Status: COMPLETED | OUTPATIENT
Start: 2024-03-27 | End: 2024-03-27

## 2024-05-08 ENCOUNTER — OFFICE VISIT (OUTPATIENT)
Dept: ORTHOPEDIC SURGERY | Facility: CLINIC | Age: 44
End: 2024-05-08
Payer: COMMERCIAL

## 2024-05-08 VITALS
HEART RATE: 78 BPM | HEIGHT: 63 IN | BODY MASS INDEX: 48.05 KG/M2 | OXYGEN SATURATION: 97 % | SYSTOLIC BLOOD PRESSURE: 124 MMHG | WEIGHT: 271.17 LBS | DIASTOLIC BLOOD PRESSURE: 83 MMHG

## 2024-05-08 DIAGNOSIS — M17.12 PRIMARY OSTEOARTHRITIS OF LEFT KNEE: Primary | ICD-10-CM

## 2024-05-08 PROCEDURE — 99213 OFFICE O/P EST LOW 20 MIN: CPT | Performed by: PHYSICIAN ASSISTANT

## 2024-09-03 DIAGNOSIS — F41.8 MIXED ANXIETY AND DEPRESSIVE DISORDER: ICD-10-CM

## 2024-09-03 RX ORDER — BUSPIRONE HYDROCHLORIDE 10 MG/1
10 TABLET ORAL NIGHTLY
Qty: 90 TABLET | Refills: 0 | Status: SHIPPED | OUTPATIENT
Start: 2024-09-03

## 2024-09-03 RX ORDER — OXCARBAZEPINE 150 MG/1
150 TABLET, FILM COATED ORAL 2 TIMES DAILY
Qty: 180 TABLET | Refills: 0 | Status: SHIPPED | OUTPATIENT
Start: 2024-09-03

## 2024-09-03 NOTE — TELEPHONE ENCOUNTER
Send 90 days on rx but in 1-2024 she had issues with her knee, I did labs in 9-2023, so have her follow up

## 2024-09-03 NOTE — TELEPHONE ENCOUNTER
Failed protocol    LOV  1/29/24    LF  6/10/24 90 day on each med       Comprehensive metabolic panel (09/11/2023 14:30)     Patient states she was told by PCP she only had to be seen once a year. Please advised

## 2024-09-10 ENCOUNTER — LAB (OUTPATIENT)
Dept: LAB | Facility: HOSPITAL | Age: 44
End: 2024-09-10
Payer: COMMERCIAL

## 2024-09-10 ENCOUNTER — OFFICE VISIT (OUTPATIENT)
Dept: FAMILY MEDICINE CLINIC | Age: 44
End: 2024-09-10
Payer: COMMERCIAL

## 2024-09-10 VITALS
WEIGHT: 260.2 LBS | BODY MASS INDEX: 46.1 KG/M2 | HEIGHT: 63 IN | TEMPERATURE: 97.5 F | HEART RATE: 84 BPM | SYSTOLIC BLOOD PRESSURE: 124 MMHG | DIASTOLIC BLOOD PRESSURE: 70 MMHG

## 2024-09-10 DIAGNOSIS — F41.8 MIXED ANXIETY AND DEPRESSIVE DISORDER: ICD-10-CM

## 2024-09-10 DIAGNOSIS — Z83.719 FAMILY HISTORY OF COLONIC POLYPS: ICD-10-CM

## 2024-09-10 DIAGNOSIS — R21 RASH OF NECK: ICD-10-CM

## 2024-09-10 DIAGNOSIS — Z12.11 SCREEN FOR COLON CANCER: ICD-10-CM

## 2024-09-10 DIAGNOSIS — J34.89 NASAL LESION: ICD-10-CM

## 2024-09-10 DIAGNOSIS — F41.8 MIXED ANXIETY AND DEPRESSIVE DISORDER: Primary | ICD-10-CM

## 2024-09-10 DIAGNOSIS — Z12.31 SCREENING MAMMOGRAM FOR BREAST CANCER: ICD-10-CM

## 2024-09-10 LAB
ALBUMIN SERPL-MCNC: 4.2 G/DL (ref 3.5–5.2)
ALBUMIN/GLOB SERPL: 1.5 G/DL
ALP SERPL-CCNC: 82 U/L (ref 39–117)
ALT SERPL W P-5'-P-CCNC: 11 U/L (ref 1–33)
ANION GAP SERPL CALCULATED.3IONS-SCNC: 9.9 MMOL/L (ref 5–15)
AST SERPL-CCNC: 16 U/L (ref 1–32)
BASOPHILS # BLD AUTO: 0.04 10*3/MM3 (ref 0–0.2)
BASOPHILS NFR BLD AUTO: 0.4 % (ref 0–1.5)
BILIRUB SERPL-MCNC: <0.2 MG/DL (ref 0–1.2)
BUN SERPL-MCNC: 15 MG/DL (ref 6–20)
BUN/CREAT SERPL: 20.8 (ref 7–25)
CALCIUM SPEC-SCNC: 8.6 MG/DL (ref 8.6–10.5)
CHLORIDE SERPL-SCNC: 102 MMOL/L (ref 98–107)
CO2 SERPL-SCNC: 26.1 MMOL/L (ref 22–29)
CREAT SERPL-MCNC: 0.72 MG/DL (ref 0.57–1)
DEPRECATED RDW RBC AUTO: 42.6 FL (ref 37–54)
EGFRCR SERPLBLD CKD-EPI 2021: 105.2 ML/MIN/1.73
EOSINOPHIL # BLD AUTO: 0.25 10*3/MM3 (ref 0–0.4)
EOSINOPHIL NFR BLD AUTO: 2.4 % (ref 0.3–6.2)
ERYTHROCYTE [DISTWIDTH] IN BLOOD BY AUTOMATED COUNT: 13.6 % (ref 12.3–15.4)
GLOBULIN UR ELPH-MCNC: 2.8 GM/DL
GLUCOSE SERPL-MCNC: 93 MG/DL (ref 65–99)
HCT VFR BLD AUTO: 36 % (ref 34–46.6)
HGB BLD-MCNC: 11.6 G/DL (ref 12–15.9)
IMM GRANULOCYTES # BLD AUTO: 0.02 10*3/MM3 (ref 0–0.05)
IMM GRANULOCYTES NFR BLD AUTO: 0.2 % (ref 0–0.5)
LYMPHOCYTES # BLD AUTO: 2.59 10*3/MM3 (ref 0.7–3.1)
LYMPHOCYTES NFR BLD AUTO: 25.1 % (ref 19.6–45.3)
MCH RBC QN AUTO: 27.6 PG (ref 26.6–33)
MCHC RBC AUTO-ENTMCNC: 32.2 G/DL (ref 31.5–35.7)
MCV RBC AUTO: 85.5 FL (ref 79–97)
MONOCYTES # BLD AUTO: 0.79 10*3/MM3 (ref 0.1–0.9)
MONOCYTES NFR BLD AUTO: 7.7 % (ref 5–12)
NEUTROPHILS NFR BLD AUTO: 6.62 10*3/MM3 (ref 1.7–7)
NEUTROPHILS NFR BLD AUTO: 64.2 % (ref 42.7–76)
PLATELET # BLD AUTO: 335 10*3/MM3 (ref 140–450)
PMV BLD AUTO: 8.2 FL (ref 6–12)
POTASSIUM SERPL-SCNC: 4 MMOL/L (ref 3.5–5.2)
PROT SERPL-MCNC: 7 G/DL (ref 6–8.5)
RBC # BLD AUTO: 4.21 10*6/MM3 (ref 3.77–5.28)
SODIUM SERPL-SCNC: 138 MMOL/L (ref 136–145)
WBC NRBC COR # BLD AUTO: 10.31 10*3/MM3 (ref 3.4–10.8)

## 2024-09-10 PROCEDURE — 99214 OFFICE O/P EST MOD 30 MIN: CPT | Performed by: NURSE PRACTITIONER

## 2024-09-10 PROCEDURE — 85025 COMPLETE CBC W/AUTO DIFF WBC: CPT

## 2024-09-10 PROCEDURE — 36415 COLL VENOUS BLD VENIPUNCTURE: CPT

## 2024-09-10 PROCEDURE — 80053 COMPREHEN METABOLIC PANEL: CPT

## 2024-09-10 RX ORDER — FLUOCINOLONE ACETONIDE 0.25 MG/G
1 CREAM TOPICAL 2 TIMES DAILY
Qty: 15 G | Refills: 0 | Status: SHIPPED | OUTPATIENT
Start: 2024-09-10

## 2024-09-10 RX ORDER — BUSPIRONE HYDROCHLORIDE 10 MG/1
10 TABLET ORAL NIGHTLY
Qty: 90 TABLET | Refills: 1 | Status: SHIPPED | OUTPATIENT
Start: 2024-09-10

## 2024-09-10 RX ORDER — OXCARBAZEPINE 150 MG/1
150 TABLET, FILM COATED ORAL 2 TIMES DAILY
Qty: 180 TABLET | Refills: 1 | Status: SHIPPED | OUTPATIENT
Start: 2024-09-10

## 2024-09-10 NOTE — PROGRESS NOTES
Chief Complaint  Anxiety (6 month follow up )    Subjective          Mazin Steve presents to [facility]    History of Present Illness  Anxiety/ Depression/ Insomnia  Current medication: triliptal and busapr   Tolerating medication: Yes  Current symptoms: none, rx's working   Refills needed: yes to [facility]     PAST MEDICAL HISTORY changes since 2024:         GYNECOLOGICAL HISTORY:    G0    No problems with menstrual cycles.    not sexually active              Surgical History:      23 laparoscopic cholecystectomy with cholangiogram        Family History:       Father: Healthy     Mother:   MI, 63 y/o    Borther: 1 healthy     Sister: fibromyalgia     Maternal Grandfather: Type 2 Diabetes     Maternal Grandmother: Coronary Artery Disease         Social History:       Occupation: [employer]      Marital Status: Single/lives alone with her cats and a dog     Children: None           Past Medical History:   Diagnosis Date    Abdominal pain     Anxiety     Back pain     Constipation     Depression     Gallstones     Herniated disc, cervical     Joint pain     PONV (postoperative nausea and vomiting)     Self-injurious behavior        Allergies   Allergen Reactions    Latex Other (See Comments)     Contact dermatitis        Past Surgical History:   Procedure Laterality Date    CHOLECYSTECTOMY WITH INTRAOPERATIVE CHOLANGIOGRAM N/A 2023    Procedure: laparoscopic cholecystectomy with cholangiogram;  Surgeon: Graciela Robles MD;  Location: [facility];  Service: General;  Laterality: N/A;    WISDOM TOOTH EXTRACTION          Social History     Tobacco Use    Smoking status: Never     Passive exposure: Never    Smokeless tobacco: Never   Substance Use Topics    Alcohol use: Not Currently       Family History   Problem Relation Age of Onset    Depression Mother     Anxiety disorder Mother     Seizures Father     Depression Father     Gallbladder  disease Sister     Gallbladder disease Brother     Alcohol abuse Maternal Grandfather     Prabhakar Hyperthermia Neg Hx         Health Maintenance Due   Topic Date Due    COLORECTAL CANCER SCREENING  Never done    TDAP/TD VACCINES (1 - Tdap) Never done    ANNUAL PHYSICAL  Never done    PAP SMEAR  Never done    BMI FOLLOWUP  02/09/2024    COVID-19 Vaccine (4 - 2023-24 season) 09/01/2024    INFLUENZA VACCINE  08/01/2024        Current Outpatient Medications on File Prior to Visit   Medication Sig    Homeopathic Products (ALLERGY MEDICINE PO) Take 1 tablet by mouth Daily.    ibuprofen (ADVIL,MOTRIN) 200 MG tablet Take 1 tablet by mouth Every 6 (Six) Hours As Needed for Mild Pain.    [DISCONTINUED] busPIRone (BUSPAR) 10 MG tablet Take 1 tablet by mouth Every Night.    [DISCONTINUED] OXcarbazepine (Trileptal) 150 MG tablet Take 1 tablet by mouth 2 (Two) Times a Day.    [DISCONTINUED] meloxicam (Mobic) 7.5 MG tablet Take 1 tablet by mouth Daily. (Patient not taking: Reported on 9/10/2024)    [DISCONTINUED] PreviDent 5000 Enamel Protect 1.1-5 % gel BRUSH WITH PASTE DAILY AT BEDTIME, SPIT OUT EXCESS, DO NOT EAT, RINSE, OR DRINK FOR ONE HOUR AFTER USING (Patient not taking: Reported on 9/10/2024)     No current facility-administered medications on file prior to visit.       Immunization History   Administered Date(s) Administered    COVID-19 (MODERNA) 1st,2nd,3rd Dose Monovalent 04/08/2022    COVID-19 (MODERNA) Monovalent Original Booster 01/07/2021, 02/02/2021    Flublok 18+yrs 10/06/2023    Flucelvax Quad Vial =>4yrs 10/21/2021    Fluzone (or Fluarix & Flulaval for VFC) >6mos 10/31/2022    Hepatitis A 06/04/2018    Influenza Seasonal Injectable 10/01/2016    Influenza, Unspecified 10/01/2016       Review of Systems   Constitutional:  Negative for fatigue and fever.   HENT:          Sore in right nares, 2 months,cut her self, not healing    Respiratory:  Negative for cough and shortness of breath.    Cardiovascular:  Negative  "for chest pain, palpitations and leg swelling.   Gastrointestinal:  Negative for blood in stool (never had a colon screen, her dad had polyps).   Genitourinary:  Negative for breast lump (would like me to get her mammogram scheduled).   Skin:  Positive for rash (on her neck, derm gave her some cream, she uses prn, would like  a refill).        Objective     Vitals:    09/10/24 1410   BP: 124/70   BP Location: Left arm   Patient Position: Sitting   Cuff Size: Large Adult   Pulse: 84   Temp: 97.5 °F (36.4 °C)   TempSrc: Oral   Weight: 118 kg (260 lb 3.2 oz)   Height: 160 cm (63\")            Physical Exam  Vitals reviewed.   Constitutional:       General: She is not in acute distress.     Appearance: Normal appearance. She is obese.   HENT:      Nose: No congestion (erythema to distal  medial right nares).   Neck:      Vascular: No carotid bruit.   Cardiovascular:      Rate and Rhythm: Normal rate and regular rhythm.      Heart sounds: Normal heart sounds. No murmur heard.  Pulmonary:      Effort: Pulmonary effort is normal. No respiratory distress.      Breath sounds: Normal breath sounds.   Musculoskeletal:      Right lower leg: No edema.      Left lower leg: No edema.   Skin:     Findings: Rash (pink macular rash to right posterior neck) present.   Neurological:      Mental Status: She is alert.   Psychiatric:         Mood and Affect: Mood normal.         Behavior: Behavior normal.         Result Review :     The following data was reviewed by: RISA Jones on 09/10/2024:                       Assessment and Plan      Diagnoses and all orders for this visit:    1. Mixed anxiety and depressive disorder (Primary)  Assessment & Plan:  continue current rx's, send for labs     Orders:  -     Comprehensive metabolic panel; Future  -     CBC w AUTO Differential; Future  -     busPIRone (BUSPAR) 10 MG tablet; Take 1 tablet by mouth Every Night.  Dispense: 90 tablet; Refill: 1  -     OXcarbazepine (Trileptal) " 150 MG tablet; Take 1 tablet by mouth 2 (Two) Times a Day.  Dispense: 180 tablet; Refill: 1    2. Family history of colonic polyps  Assessment & Plan:  Send for CLN    Orders:  -     Ambulatory Referral to Gastroenterology    3. Screen for colon cancer  Assessment & Plan:  Send for CLN    Orders:  -     Ambulatory Referral to Gastroenterology    4. Screening mammogram for breast cancer  Assessment & Plan:  Will go ahead and set up mammogram     Orders:  -     Mammo Screening Digital Tomosynthesis Bilateral With CAD; Future    5. Rash of neck  Assessment & Plan:  Refilled cream, use short term, if persist, will send back to derm     Orders:  -     fluocinolone (SYNALAR) 0.025 % cream; Apply 1 Application topically to the appropriate area as directed 2 (Two) Times a Day.  Dispense: 15 g; Refill: 0    6. Nasal lesion  Assessment & Plan:  Send to ENT    Orders:  -     Ambulatory Referral to ENT (Otolaryngology)        Class 3 Severe Obesity (BMI >=40). Obesity-related health conditions include the following: none. Obesity is improving with lifestyle modifications. BMI is is above average; BMI management plan is completed. We discussed portion control and increasing exercise.       I spent 19 minutes caring for Mazin on this date of service. This time includes time spent by me in the following activities:    Follow Up     Return for followup pending lab results.    Patient was given instructions and counseling regarding her condition or for health maintenance advice. Please see specific information pulled into the AVS if appropriate.

## 2024-10-08 ENCOUNTER — TELEPHONE (OUTPATIENT)
Dept: GASTROENTEROLOGY | Facility: CLINIC | Age: 44
End: 2024-10-08
Payer: COMMERCIAL

## 2024-10-08 ENCOUNTER — PREP FOR SURGERY (OUTPATIENT)
Dept: OTHER | Facility: HOSPITAL | Age: 44
End: 2024-10-08
Payer: COMMERCIAL

## 2024-10-08 DIAGNOSIS — Z12.11 ENCOUNTER FOR SCREENING FOR MALIGNANT NEOPLASM OF COLON: Primary | ICD-10-CM

## 2024-10-08 NOTE — TELEPHONE ENCOUNTER
SCREENING C/S    NO PERSONAL HX OF POLYPS     FAMILY HX OF POLYPS    NO FAMILY HX OF COLON CA    NO ASA OR BLOOD THINNERS              LIST OF  MEDICATIONS    MOTRIN  OXCARBAZEPINE  BUSPIRONE      OA QUESTIONNAIRE SCANNED IN MEDIA

## 2024-10-14 ENCOUNTER — TELEPHONE (OUTPATIENT)
Dept: GASTROENTEROLOGY | Facility: CLINIC | Age: 44
End: 2024-10-14
Payer: COMMERCIAL

## 2024-10-14 PROBLEM — Z12.11 ENCOUNTER FOR SCREENING FOR MALIGNANT NEOPLASM OF COLON: Status: ACTIVE | Noted: 2024-10-08

## 2024-11-06 ENCOUNTER — HOSPITAL ENCOUNTER (OUTPATIENT)
Dept: MAMMOGRAPHY | Facility: HOSPITAL | Age: 44
Discharge: HOME | End: 2024-11-06
Admitting: NURSE PRACTITIONER
Payer: COMMERCIAL

## 2024-11-06 DIAGNOSIS — Z12.31 SCREENING MAMMOGRAM FOR BREAST CANCER: ICD-10-CM

## 2024-11-06 PROCEDURE — 77063 BREAST TOMOSYNTHESIS BI: CPT

## 2024-11-06 PROCEDURE — 77067 SCR MAMMO BI INCL CAD: CPT

## 2025-01-08 ENCOUNTER — HOSPITAL ENCOUNTER (OUTPATIENT)
Facility: HOSPITAL | Age: 45
Setting detail: HOSPITAL OUTPATIENT SURGERY
Discharge: HOME | End: 2025-01-08
Attending: INTERNAL MEDICINE | Admitting: INTERNAL MEDICINE
Payer: COMMERCIAL

## 2025-01-08 ENCOUNTER — ANESTHESIA (OUTPATIENT)
Dept: GASTROENTEROLOGY | Facility: HOSPITAL | Age: 45
End: 2025-01-08
Payer: COMMERCIAL

## 2025-01-08 ENCOUNTER — ANESTHESIA EVENT (OUTPATIENT)
Dept: GASTROENTEROLOGY | Facility: HOSPITAL | Age: 45
End: 2025-01-08
Payer: COMMERCIAL

## 2025-01-08 VITALS
HEART RATE: 87 BPM | DIASTOLIC BLOOD PRESSURE: 95 MMHG | RESPIRATION RATE: 16 BRPM | OXYGEN SATURATION: 99 % | HEIGHT: 63 IN | BODY MASS INDEX: 47.02 KG/M2 | WEIGHT: 265.4 LBS | SYSTOLIC BLOOD PRESSURE: 133 MMHG

## 2025-01-08 PROCEDURE — 25810000003 LACTATED RINGERS PER 1000 ML: Performed by: STUDENT IN AN ORGANIZED HEALTH CARE EDUCATION/TRAINING PROGRAM

## 2025-01-08 PROCEDURE — 45378 DIAGNOSTIC COLONOSCOPY: CPT | Performed by: INTERNAL MEDICINE

## 2025-01-08 PROCEDURE — 81025 URINE PREGNANCY TEST: CPT | Performed by: INTERNAL MEDICINE

## 2025-01-08 PROCEDURE — 25010000002 PROPOFOL 200 MG/20ML EMULSION: Performed by: STUDENT IN AN ORGANIZED HEALTH CARE EDUCATION/TRAINING PROGRAM

## 2025-01-08 PROCEDURE — 25010000002 PROPOFOL 1000 MG/100ML EMULSION: Performed by: STUDENT IN AN ORGANIZED HEALTH CARE EDUCATION/TRAINING PROGRAM

## 2025-01-08 PROCEDURE — S0260 H&P FOR SURGERY: HCPCS | Performed by: INTERNAL MEDICINE

## 2025-01-08 PROCEDURE — 25010000002 LIDOCAINE 2% SOLUTION: Performed by: STUDENT IN AN ORGANIZED HEALTH CARE EDUCATION/TRAINING PROGRAM

## 2025-01-08 PROCEDURE — 25810000003 LACTATED RINGERS PER 1000 ML: Performed by: INTERNAL MEDICINE

## 2025-01-08 RX ORDER — PROPOFOL 10 MG/ML
INJECTION, EMULSION INTRAVENOUS AS NEEDED
Status: DISCONTINUED | OUTPATIENT
Start: 2025-01-08 | End: 2025-01-08 | Stop reason: SURG

## 2025-01-08 RX ORDER — PROPOFOL 10 MG/ML
INJECTION, EMULSION INTRAVENOUS CONTINUOUS PRN
Status: DISCONTINUED | OUTPATIENT
Start: 2025-01-08 | End: 2025-01-08 | Stop reason: SURG

## 2025-01-08 RX ORDER — LIDOCAINE HYDROCHLORIDE 20 MG/ML
INJECTION, SOLUTION INFILTRATION; PERINEURAL AS NEEDED
Status: DISCONTINUED | OUTPATIENT
Start: 2025-01-08 | End: 2025-01-08 | Stop reason: SURG

## 2025-01-08 RX ORDER — SODIUM CHLORIDE, SODIUM LACTATE, POTASSIUM CHLORIDE, CALCIUM CHLORIDE 600; 310; 30; 20 MG/100ML; MG/100ML; MG/100ML; MG/100ML
INJECTION, SOLUTION INTRAVENOUS CONTINUOUS PRN
Status: DISCONTINUED | OUTPATIENT
Start: 2025-01-08 | End: 2025-01-08 | Stop reason: SURG

## 2025-01-08 RX ORDER — SODIUM CHLORIDE, SODIUM LACTATE, POTASSIUM CHLORIDE, CALCIUM CHLORIDE 600; 310; 30; 20 MG/100ML; MG/100ML; MG/100ML; MG/100ML
30 INJECTION, SOLUTION INTRAVENOUS CONTINUOUS PRN
Status: DISCONTINUED | OUTPATIENT
Start: 2025-01-08 | End: 2025-01-08 | Stop reason: HOSPADM

## 2025-01-08 RX ADMIN — SODIUM CHLORIDE, POTASSIUM CHLORIDE, SODIUM LACTATE AND CALCIUM CHLORIDE 30 ML/HR: 600; 310; 30; 20 INJECTION, SOLUTION INTRAVENOUS at 08:03

## 2025-01-08 RX ADMIN — LIDOCAINE HYDROCHLORIDE 60 MG: 20 INJECTION, SOLUTION INFILTRATION; PERINEURAL at 08:30

## 2025-01-08 RX ADMIN — SODIUM CHLORIDE, POTASSIUM CHLORIDE, SODIUM LACTATE AND CALCIUM CHLORIDE: 600; 310; 30; 20 INJECTION, SOLUTION INTRAVENOUS at 08:30

## 2025-01-08 RX ADMIN — PROPOFOL 160 MCG/KG/MIN: 10 INJECTION, EMULSION INTRAVENOUS at 08:30

## 2025-01-08 RX ADMIN — PROPOFOL INJECTABLE EMULSION 80 MG: 10 INJECTION, EMULSION INTRAVENOUS at 08:30

## 2025-01-08 NOTE — ANESTHESIA POSTPROCEDURE EVALUATION
Patient: Mazin Steve    Procedure Summary       Date: 01/08/25 Room / Location:  [facility]    Anesthesia Start: 0826 Anesthesia Stop: 0851    Procedure: COLONOSCOPY to cecum Diagnosis:       Encounter for screening for malignant neoplasm of colon      (Encounter for screening for malignant neoplasm of colon [Z12.11])    Surgeons: Doreen Rodriguez MD Provider: Andrew Guerin MD    Anesthesia Type: MAC ASA Status: 3            Anesthesia Type: MAC    Vitals  No vitals data found for the desired time range.          Post Anesthesia Care and Evaluation    Patient location during evaluation: bedside  Patient participation: complete - patient participated  Level of consciousness: awake and alert  Pain management: adequate    Airway patency: patent  Anesthetic complications: No anesthetic complications  PONV Status: controlled  Cardiovascular status: blood pressure returned to baseline and acceptable  Respiratory status: acceptable  Hydration status: acceptable

## 2025-01-08 NOTE — NURSING NOTE
Patient has hx of suicide ideation in the past month. She screened moderate today on the suicide screen. Patient reports this is baseline for her and on meds for it at home. Dr. Rodriguez notified. [facility] called, no answer left VM.

## 2025-01-08 NOTE — ANESTHESIA PREPROCEDURE EVALUATION
Anesthesia Evaluation     Patient summary reviewed and Nursing notes reviewed   history of anesthetic complications:  PONV  NPO Solid Status: > 8 hours  NPO Liquid Status: > 2 hours           Airway   Mallampati: II  TM distance: >3 FB  Neck ROM: full  Large neck circumference  Dental      Pulmonary    Cardiovascular - negative cardio ROS        Neuro/Psych  (+) numbness, psychiatric history Depression and Anxiety  GI/Hepatic/Renal/Endo    (+) obesity    Musculoskeletal     (+) back pain, neck pain  Abdominal    Substance History - negative use     OB/GYN negative ob/gyn ROS         Other - negative ROS                     Anesthesia Plan    ASA 3     MAC     intravenous induction     Anesthetic plan, risks, benefits, and alternatives have been provided, discussed and informed consent has been obtained with: patient.      CODE STATUS:

## 2025-01-08 NOTE — NURSING NOTE
"Pt triggered a consult from [facility] based on her answers to admission questions. She stated that she has had suicidal ideation in the last month but no plan.    [facility] called prior to procedure at around 0830. Estimate of about an hour given for when they would arrive to evaluate. At 0911, this RN told pt that the [facility] nurse would try to be here at 0930. Pt got dressed and waited.     Around 0950 pt notified this RN that she would wait 4 more minutes and then she was leaving. \"I have waited long enough.\"    I called [facility] to notify them of this and spoke with Liberty who told me she just finished seeing and documenting on an ER patient and was beginning to review Mazin's chart. She estimated that she would be up to our department in 15 minutes. I notified the patient of this and she said that that was too long.     She offered to sign something, but she was leaving. Pt refused to be wheeled out in a wheelchair and walked toward the waiting room to meet her father and go home.     Moments later, Liberty from [facility] arrived to the Endoscopy Department.     At 1050 am, This RN called pt's father (who did not answer). I then called the pt. She answered and we reviewed her discharge instructions again. I also asked her if she felt like she has adequate support to help her in these areas. She said she does not. She also stated that she does not want a provider in the Epic system. Encouraged pt to pursue mental health resources and potentially look to Primary Care Provider for additional help and that she is not alone.   "

## 2025-01-08 NOTE — H&P
[facility]  Pre Procedure History & Physical    Chief Complaint:   screening    Subjective     HPI:   45 yo here today for first colonoscopy.  Pt reports FH polyps.  Patient denies GI symptoms currently.       Past Medical History:   Past Medical History:   Diagnosis Date    Abdominal pain     Anesthesia     PT HARD TO WAKE UP    Anxiety     Back pain     Constipation     Depression     Herniated disc, cervical     Joint pain     PONV (postoperative nausea and vomiting)     Self-injurious behavior        Past Surgical History:  Past Surgical History:   Procedure Laterality Date    CHOLECYSTECTOMY WITH INTRAOPERATIVE CHOLANGIOGRAM N/A 1/12/2023    Procedure: laparoscopic cholecystectomy with cholangiogram;  Surgeon: Graciela Robles MD;  Location: [facility] OR;  Service: General;  Laterality: N/A;    WISDOM TOOTH EXTRACTION         Family History:  Family History   Problem Relation Age of Onset    Depression Mother     Anxiety disorder Mother     Seizures Father     Depression Father     Gallbladder disease Sister     Gallbladder disease Brother     Alcohol abuse Maternal Grandfather     Malig Hyperthermia Neg Hx        Social History:   reports that she has never smoked. She has never been exposed to tobacco smoke. She has never used smokeless tobacco. She reports that she does not currently use alcohol. She reports that she does not use drugs.    Medications:   Medications Prior to Admission   Medication Sig Dispense Refill Last Dose/Taking    busPIRone (BUSPAR) 10 MG tablet Take 1 tablet by mouth Every Night. 90 tablet 1 1/7/2025    fluocinolone (SYNALAR) 0.025 % cream Apply 1 Application topically to the appropriate area as directed 2 (Two) Times a Day. (Patient taking differently: Apply 1 Application topically to the appropriate area as directed As Needed.) 15 g 0 Past Month    Homeopathic Products (ALLERGY MEDICINE PO) Take 1 tablet by mouth Daily.   1/7/2025    ibuprofen  "(ADVIL,MOTRIN) 200 MG tablet Take 1 tablet by mouth Every 6 (Six) Hours As Needed for Mild Pain.   1/7/2025    OXcarbazepine (Trileptal) 150 MG tablet Take 1 tablet by mouth 2 (Two) Times a Day. (Patient taking differently: Take 1 tablet by mouth Every Evening.) 180 tablet 1 1/7/2025       Allergies:  Latex    ROS:    Pertinent items are noted in HPI     Objective     Blood pressure 143/86, pulse 79, resp. rate 17, height 160 cm (63\"), weight 120 kg (265 lb 6.4 oz), last menstrual period 12/26/2024, SpO2 100%.    Physical Exam   Constitutional: Pt is oriented to person, place, and time and well-developed, well-nourished, and in no distress.   Mouth/Throat: Oropharynx is clear and moist.   Neck: Normal range of motion.   Cardiovascular: Normal rate, regular rhythm    Pulmonary/Chest: Effort normal    Abdominal: Soft. Nontender  Skin: Skin is warm and dry.   Psychiatric: Mood, memory, affect and judgment normal.     Assessment & Plan     Diagnosis:  Screening for colon cancer-FH polyps    Anticipated Surgical Procedure:  colonoscopy    The risks, benefits, and alternatives of this procedure have been discussed with the patient or the responsible party- the patient understands and agrees to proceed.                                                              "

## 2025-01-10 NOTE — CASE MANAGEMENT/SOCIAL WORK
Continued Stay Note  [facility]     Patient Name: Mazin Steve  MRN: [id number]  Today's Date: 1/10/2025    Admit Date: 1/8/2025        Discharge Plan       Row Name 01/10/25 1624       Plan    Plan Comments Per Online status report (DANIELLA Steve, ID [id number]); the report submitted DOES NOT meet acceptance criteria for further assessment and will NOT be assigned to a /staff.                   Discharge Codes    No documentation.                 Expected Discharge Date and Time       Expected Discharge Date Expected Discharge Time    Jan 8, 2025

## 2025-03-05 ENCOUNTER — CLINICAL SUPPORT (OUTPATIENT)
Dept: FAMILY MEDICINE CLINIC | Age: 45
End: 2025-03-05
Payer: COMMERCIAL

## 2025-03-05 DIAGNOSIS — S60.416A ABRASION OF RIGHT LITTLE FINGER, INITIAL ENCOUNTER: Primary | ICD-10-CM

## 2025-03-31 ENCOUNTER — TELEPHONE (OUTPATIENT)
Dept: FAMILY MEDICINE CLINIC | Age: 45
End: 2025-03-31
Payer: COMMERCIAL

## 2025-03-31 NOTE — TELEPHONE ENCOUNTER
Caller: Mazin Steve    Relationship to patient: Self    Best call back number: [phone]    Chief complaint: N/A    Type of visit: IN OFFICE PROCEDURE    Requested date: ASAP     If rescheduling, when is the original appointment: N/A     Additional notes:PATIENT HAS A WART ON BOTH LEFT AND RIGHT HANDS SPREADING. SHE WOULD LIKE TO HAVE THESE REMOVED. PLEASE CALL PATIENT TO SCHEDULE.

## 2025-04-03 ENCOUNTER — LAB (OUTPATIENT)
Dept: LAB | Facility: HOSPITAL | Age: 45
End: 2025-04-03
Payer: COMMERCIAL

## 2025-04-03 ENCOUNTER — PROCEDURE VISIT (OUTPATIENT)
Dept: FAMILY MEDICINE CLINIC | Age: 45
End: 2025-04-03
Payer: COMMERCIAL

## 2025-04-03 VITALS
TEMPERATURE: 97.6 F | HEART RATE: 74 BPM | SYSTOLIC BLOOD PRESSURE: 132 MMHG | RESPIRATION RATE: 18 BRPM | WEIGHT: 276.2 LBS | BODY MASS INDEX: 48.94 KG/M2 | OXYGEN SATURATION: 100 % | DIASTOLIC BLOOD PRESSURE: 97 MMHG | HEIGHT: 63 IN

## 2025-04-03 DIAGNOSIS — Z83.3 FAMILY HISTORY OF DIABETES MELLITUS: ICD-10-CM

## 2025-04-03 DIAGNOSIS — L98.9 SKIN LESION: Primary | ICD-10-CM

## 2025-04-03 DIAGNOSIS — R03.0 ELEVATED BLOOD PRESSURE READING: ICD-10-CM

## 2025-04-03 DIAGNOSIS — R63.5 WEIGHT GAIN: ICD-10-CM

## 2025-04-03 PROBLEM — J34.89 NASAL LESION: Status: RESOLVED | Noted: 2024-09-10 | Resolved: 2025-04-03

## 2025-04-03 LAB
ALBUMIN SERPL-MCNC: 4 G/DL (ref 3.5–5.2)
ALBUMIN/GLOB SERPL: 1.4 G/DL
ALP SERPL-CCNC: 81 U/L (ref 39–117)
ALT SERPL W P-5'-P-CCNC: 12 U/L (ref 1–33)
ANION GAP SERPL CALCULATED.3IONS-SCNC: 10.9 MMOL/L (ref 5–15)
AST SERPL-CCNC: 18 U/L (ref 1–32)
BILIRUB SERPL-MCNC: 0.3 MG/DL (ref 0–1.2)
BUN SERPL-MCNC: 15 MG/DL (ref 6–20)
BUN/CREAT SERPL: 19.2 (ref 7–25)
CALCIUM SPEC-SCNC: 9.2 MG/DL (ref 8.6–10.5)
CHLORIDE SERPL-SCNC: 101 MMOL/L (ref 98–107)
CO2 SERPL-SCNC: 25.1 MMOL/L (ref 22–29)
CREAT SERPL-MCNC: 0.78 MG/DL (ref 0.57–1)
EGFRCR SERPLBLD CKD-EPI 2021: 95 ML/MIN/1.73
GLOBULIN UR ELPH-MCNC: 2.8 GM/DL
GLUCOSE SERPL-MCNC: 86 MG/DL (ref 65–99)
HBA1C MFR BLD: 5.5 % (ref 4.8–5.6)
POTASSIUM SERPL-SCNC: 4.3 MMOL/L (ref 3.5–5.2)
PROT SERPL-MCNC: 6.8 G/DL (ref 6–8.5)
SODIUM SERPL-SCNC: 137 MMOL/L (ref 136–145)
TSH SERPL DL<=0.05 MIU/L-ACNC: 1.68 UIU/ML (ref 0.27–4.2)

## 2025-04-03 PROCEDURE — 36415 COLL VENOUS BLD VENIPUNCTURE: CPT

## 2025-04-03 PROCEDURE — 83036 HEMOGLOBIN GLYCOSYLATED A1C: CPT

## 2025-04-03 PROCEDURE — 80053 COMPREHEN METABOLIC PANEL: CPT

## 2025-04-03 PROCEDURE — 84443 ASSAY THYROID STIM HORMONE: CPT | Performed by: NURSE PRACTITIONER

## 2025-04-03 PROCEDURE — 99214 OFFICE O/P EST MOD 30 MIN: CPT | Performed by: NURSE PRACTITIONER

## 2025-04-03 NOTE — ASSESSMENT & PLAN NOTE
Cryotherapy X 2 to 5 lesions on hands, discussed skin care, discussed follow up if persist, may need to see derm

## 2025-04-03 NOTE — ASSESSMENT & PLAN NOTE
Send her to the lab, she is not fasting, discussed diet, exercise, weight loss, monitor her bp/have it rechecked here

## 2025-04-03 NOTE — PROGRESS NOTES
Chief Complaint  Plantar Warts    Subjective          Mazin Steve presents to [facility]  History of Present Illness  Skin lesions on hands   Symptoms started: increased lesions in the last few months, 3 on right hand palm, one is on the thumb and 2 on left palm    Areas effected: hands only and feel irritated   Remedies tried: nothing     ENT nasal lesion resolved     Has issues with weight gain, has knee pain that interferes with her exercises, works on her diet, asking about GLP 1 or phentermine    PAST MEDICAL HISTORY changes since 9-:         GYNECOLOGICAL HISTORY:    G0    No problems with menstrual cycles.    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     Marital Status: Single/lives alone with her cats and a dog     Children: None                   Past Medical History:   Diagnosis Date    Abdominal pain     Anesthesia     PT HARD TO WAKE UP    Anxiety     Back pain     Constipation     Depression     Herniated disc, cervical     Joint pain     PONV (postoperative nausea and vomiting)     Self-injurious behavior        Allergies   Allergen Reactions    Latex Other (See Comments)     Contact dermatitis        Past Surgical History:   Procedure Laterality Date    CHOLECYSTECTOMY WITH INTRAOPERATIVE CHOLANGIOGRAM N/A 2023    Procedure: laparoscopic cholecystectomy with cholangiogram;  Surgeon: Graciela Robles MD;  Location: [facility];  Service: General;  Laterality: N/A;    COLONOSCOPY N/A 2025    Procedure: COLONOSCOPY to cecum;  Surgeon: Jennifer  Doreen BOYLE MD;  Location: [facility] ENDOSCOPY;  Service: Gastroenterology;  Laterality: N/A;  pre: colon screening  post: diverticulosis, hemorrhoids    WISDOM TOOTH EXTRACTION          Social History     Tobacco Use    Smoking status: Never     Passive exposure: Never    Smokeless tobacco: Never   Substance Use Topics    Alcohol use: Not Currently       Family History   Problem Relation Age of Onset    Depression Mother     Anxiety disorder Mother     Seizures Father     Depression Father     Gallbladder disease Sister     Gallbladder disease Brother     Alcohol abuse Maternal Grandfather     Malig Hyperthermia Neg Hx         Health Maintenance Due   Topic Date Due    PAP SMEAR  Never done    ANNUAL PHYSICAL  Never done    COVID-19 Vaccine (4 - 2024-25 season) 09/01/2024        Current Outpatient Medications on File Prior to Visit   Medication Sig    busPIRone (BUSPAR) 10 MG tablet Take 1 tablet by mouth Every Night.    fluocinolone (SYNALAR) 0.025 % cream Apply 1 Application topically to the appropriate area as directed 2 (Two) Times a Day. (Patient taking differently: Apply 1 Application topically to the appropriate area as directed As Needed.)    Homeopathic Products (ALLERGY MEDICINE PO) Take 1 tablet by mouth Daily.    ibuprofen (ADVIL,MOTRIN) 200 MG tablet Take 1 tablet by mouth Every 6 (Six) Hours As Needed for Mild Pain.    OXcarbazepine (Trileptal) 150 MG tablet Take 1 tablet by mouth 2 (Two) Times a Day. (Patient taking differently: Take 1 tablet by mouth Every Evening.)     No current facility-administered medications on file prior to visit.       Immunization History   Administered Date(s) Administered    COVID-19 (MODERNA) 1st,2nd,3rd Dose Monovalent 04/08/2022    COVID-19 (MODERNA) Monovalent Original Booster 01/07/2021, 02/02/2021    Flublok 18+yrs 10/06/2023    Flucelvax Quad Vial =>4yrs 10/21/2021    Fluzone  >6mos 10/08/2024    Fluzone (or Fluarix & Flulaval for VFC) >6mos 10/31/2022    Hepatitis A  "06/04/2018    Influenza Seasonal Injectable 10/01/2016    Influenza, Unspecified 10/01/2016    Tdap 03/05/2025       Review of Systems   Constitutional:  Negative for fatigue and fever.   Respiratory:  Negative for cough and shortness of breath.    Cardiovascular:  Negative for chest pain, palpitations and leg swelling.        Objective     Vitals:    04/03/25 0752   BP: 132/97   BP Location: Left arm   Patient Position: Sitting   Cuff Size: Adult   Pulse: 74   Resp: 18   Temp: 97.6 °F (36.4 °C)   TempSrc: Oral   SpO2: 100%  Comment: room air   Weight: 125 kg (276 lb 3.2 oz)   Height: 160 cm (62.99\")            Physical Exam  Vitals reviewed.   Constitutional:       General: She is not in acute distress.     Appearance: Normal appearance.   Neck:      Vascular: No carotid bruit.   Cardiovascular:      Rate and Rhythm: Normal rate and regular rhythm.      Heart sounds: Normal heart sounds. No murmur heard.  Pulmonary:      Effort: Pulmonary effort is normal. No respiratory distress.      Breath sounds: Normal breath sounds.   Musculoskeletal:      Right lower leg: No edema.      Left lower leg: No edema.   Skin:     Findings: Lesion (tiny raised  lesions on palm of R and L hands. slightly raised and cruciferous, one is on her right mid lateral thumb and  3 on R and 2 on palm of left hands) present.   Neurological:      Mental Status: She is alert.   Psychiatric:         Mood and Affect: Mood normal.         Behavior: Behavior normal.         Result Review :     The following data was reviewed by: RISA Jones on 04/03/2025:                       Assessment and Plan      Diagnoses and all orders for this visit:    1. Skin lesion (Primary)  Assessment & Plan:  Cryotherapy X 2 to 5 lesions on hands, discussed skin care, discussed follow up if persist, may need to see derm       2. Weight gain  Assessment & Plan:  Has eaten today, checking labs      Orders:  -     Hemoglobin A1c; Future  -     Comprehensive " metabolic panel; Future  -     TSH Rfx On Abnormal To Free T4    3. Family history of diabetes mellitus  Assessment & Plan:  Checking a A1C, discussed rx's, risk vs benefit     Orders:  -     Hemoglobin A1c; Future    4. Elevated blood pressure reading  Assessment & Plan:  Send her to the lab, she is not fasting, discussed diet, exercise, weight loss, monitor her bp/have it rechecked here     Orders:  -     Comprehensive metabolic panel; Future                  Follow Up     Return if symptoms worsen or fail to improve, for followup pending lab results.    Patient was given instructions and counseling regarding her condition or for health maintenance advice. Please see specific information pulled into the AVS if appropriate.

## 2025-04-04 ENCOUNTER — RESULTS FOLLOW-UP (OUTPATIENT)
Dept: FAMILY MEDICINE CLINIC | Age: 45
End: 2025-04-04
Payer: COMMERCIAL

## 2025-04-04 NOTE — LETTER
Mazin KHAN [address] [address]    April 4, 2025     Mazin,     Your complete metabolic panel, thyroid lab and A1C were all normal.     Below are the results from your recent visit:    Resulted Orders   TSH Rfx On Abnormal To Free T4   Result Value Ref Range    TSH 1.680 0.270 - 4.200 uIU/mL   Hemoglobin A1c   Result Value Ref Range    Hemoglobin A1C 5.50 4.80 - 5.60 %   Comprehensive metabolic panel   Result Value Ref Range    Glucose 86 65 - 99 mg/dL    BUN 15 6 - 20 mg/dL    Creatinine 0.78 0.57 - 1.00 mg/dL    Sodium 137 136 - 145 mmol/L    Potassium 4.3 3.5 - 5.2 mmol/L    Chloride 101 98 - 107 mmol/L    CO2 25.1 22.0 - 29.0 mmol/L    Calcium 9.2 8.6 - 10.5 mg/dL    Total Protein 6.8 6.0 - 8.5 g/dL    Albumin 4.0 3.5 - 5.2 g/dL    ALT (SGPT) 12 1 - 33 U/L    AST (SGOT) 18 1 - 32 U/L    Alkaline Phosphatase 81 39 - 117 U/L    Total Bilirubin 0.3 0.0 - 1.2 mg/dL    Globulin 2.8 gm/dL    A/G Ratio 1.4 g/dL    BUN/Creatinine Ratio 19.2 7.0 - 25.0    Anion Gap 10.9 5.0 - 15.0 mmol/L    eGFR 95.0 >60.0 mL/min/1.73         If you have any questions or concerns, please don't hesitate to call.         Sincerely,        RISA Jones

## 2025-05-23 DIAGNOSIS — F41.8 MIXED ANXIETY AND DEPRESSIVE DISORDER: ICD-10-CM

## 2025-05-23 NOTE — TELEPHONE ENCOUNTER
Caller: Mazin Steve    Relationship: Self    Best call back number:   Telephone Information:   Mobile [phone]         Requested Prescriptions:   Requested Prescriptions     Pending Prescriptions Disp Refills    busPIRone (BUSPAR) 10 MG tablet 90 tablet 1     Sig: Take 1 tablet by mouth Every Night.    OXcarbazepine (Trileptal) 150 MG tablet 180 tablet 1     Sig: Take 1 tablet by mouth 2 (Two) Times a Day.        Pharmacy where request should be sent: [facility])     Last office visit with prescribing clinician: 9/10/2024   Last telemedicine visit with prescribing clinician: Visit date not found   Next office visit with prescribing clinician: 9/10/2025     Additional details provided by patient:      THE PATIENT SAID THERE ARE NO REFILLS AND SHE WILL BE OUT ON 6/3/25. SHE IS REQUESTING REFILLS ON THIS MEDICATION. SHE SAID HER NEXT APPOINTMENT IS IN OCTOBER.     Does the patient have less than a 3 day supply:  [] Yes  [x] No    Would you like a call back once the refill request has been completed: [] Yes [x] No    If the office needs to give you a call back, can they leave a voicemail: [] Yes [x] No    Marilee Muller Rep   05/23/25 16:09 EDT

## 2025-05-24 RX ORDER — OXCARBAZEPINE 150 MG/1
150 TABLET, FILM COATED ORAL EVERY EVENING
Qty: 90 TABLET | Refills: 0 | Status: SHIPPED | OUTPATIENT
Start: 2025-05-24

## 2025-05-24 RX ORDER — BUSPIRONE HYDROCHLORIDE 10 MG/1
10 TABLET ORAL NIGHTLY
Qty: 90 TABLET | Refills: 0 | Status: SHIPPED | OUTPATIENT
Start: 2025-05-24

## 2025-05-28 ENCOUNTER — TELEPHONE (OUTPATIENT)
Dept: FAMILY MEDICINE CLINIC | Age: 45
End: 2025-05-28
Payer: COMMERCIAL

## 2025-05-28 DIAGNOSIS — F41.8 MIXED ANXIETY AND DEPRESSIVE DISORDER: ICD-10-CM

## 2025-05-28 NOTE — TELEPHONE ENCOUNTER
Caller: Mazin Steve    Relationship: Self    Best call back number: [phone]    What is the best time to reach you: ANYTIME    Who are you requesting to speak with (clinical staff, provider,  specific staff member): NURSE OR ISADORA FLOREZ,, RISA     Do you know the name of the person who called:     What was the call regarding: PATIENT IS CALLING REGARDING MEDICATION - OXCARBAZEPINE, SHE STATES THAT SHE HAS BEEN TAKEN 2 TABLETS BY MOUTH EVERY EVENING AND THE NEX ONE SHE JUST RECEIVED IS ONE TABLET  EVERY EVENING.  SHE NEEDS A NEW SCRIPT SENT OVER FOR THE CORRECT DOSAGE.

## 2025-05-29 RX ORDER — OXCARBAZEPINE 150 MG/1
150 TABLET, FILM COATED ORAL 2 TIMES DAILY
Qty: 180 TABLET | Refills: 0 | Status: SHIPPED | OUTPATIENT
Start: 2025-05-29

## 2025-05-29 NOTE — TELEPHONE ENCOUNTER
Pt called back and said the oxcarbazepine 150mg was sent in as one a day and that is incorrect, she takes it twice a day.  Corrected on her med list and resent as A Hui directed.

## 2025-07-14 ENCOUNTER — OFFICE VISIT (OUTPATIENT)
Dept: FAMILY MEDICINE CLINIC | Age: 45
End: 2025-07-14
Payer: COMMERCIAL

## 2025-07-14 VITALS
HEIGHT: 63 IN | DIASTOLIC BLOOD PRESSURE: 86 MMHG | OXYGEN SATURATION: 99 % | TEMPERATURE: 95.1 F | SYSTOLIC BLOOD PRESSURE: 151 MMHG | BODY MASS INDEX: 47.84 KG/M2 | WEIGHT: 270 LBS | HEART RATE: 80 BPM

## 2025-07-14 DIAGNOSIS — H69.91 DYSFUNCTION OF RIGHT EUSTACHIAN TUBE: ICD-10-CM

## 2025-07-14 DIAGNOSIS — R03.0 ELEVATED BLOOD PRESSURE READING: Primary | ICD-10-CM

## 2025-07-14 PROCEDURE — 99213 OFFICE O/P EST LOW 20 MIN: CPT | Performed by: NURSE PRACTITIONER

## 2025-07-14 RX ORDER — OFLOXACIN 3 MG/ML
10 SOLUTION AURICULAR (OTIC) DAILY
COMMUNITY

## 2025-07-14 NOTE — PROGRESS NOTES
Chief Complaint  Ear Drainage (Right ear leaking fluid )    Subjective          Mazin Steve presents to [facility]    History of Present Illness  R ear leaking fluid   When did symptoms start:  > 1 month ago  Symptoms:  pain from right ear, clear drainage, sinus/nasal congestion all clear  Treatment tried:  ofloxacin (on claritin then zyrtec)   She has improved    PAST MEDICAL HISTORY changes since 4-3-2025:         GYNECOLOGICAL HISTORY:    G0    No problems with menstrual cycles.    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     Marital Status: Single/lives alone with her cats and a dog     Children: None              Past Medical History:   Diagnosis Date    Abdominal pain     Anesthesia     PT HARD TO WAKE UP    Anxiety     Back pain     Constipation     Depression     Herniated disc, cervical     Joint pain     PONV (postoperative nausea and vomiting)     Self-injurious behavior        Allergies   Allergen Reactions    Latex Other (See Comments)     Contact dermatitis        Past Surgical History:   Procedure Laterality Date    CHOLECYSTECTOMY WITH INTRAOPERATIVE CHOLANGIOGRAM N/A 2023    Procedure: laparoscopic cholecystectomy with cholangiogram;  Surgeon: Graciela Robles MD;  Location: [facility] OR;  Service: General;  Laterality: N/A;    COLONOSCOPY N/A 2025    Procedure: COLONOSCOPY to cecum;  Surgeon: Doreen Rodriguez MD;  Location: [facility] ENDOSCOPY;  Service: Gastroenterology;  Laterality: N/A;  pre: colon screening  post: diverticulosis, hemorrhoids     WISDOM TOOTH EXTRACTION          Social History     Tobacco Use    Smoking status: Never     Passive exposure: Never    Smokeless tobacco: Never   Substance Use Topics    Alcohol use: Not Currently       Family History   Problem Relation Age of Onset    Depression Mother     Anxiety disorder Mother     Seizures Father     Depression Father     Gallbladder disease Sister     Gallbladder disease Brother     Alcohol abuse Maternal Grandfather     Malig Hyperthermia Neg Hx         Health Maintenance Due   Topic Date Due    PAP SMEAR  Never done    ANNUAL PHYSICAL  Never done    COVID-19 Vaccine (4 - 2024-25 season) 09/01/2024        Current Outpatient Medications on File Prior to Visit   Medication Sig    busPIRone (BUSPAR) 10 MG tablet Take 1 tablet by mouth Every Night.    fluocinolone (SYNALAR) 0.025 % cream Apply 1 Application topically to the appropriate area as directed 2 (Two) Times a Day. (Patient taking differently: Apply 1 Application topically to the appropriate area as directed As Needed.)    Homeopathic Products (ALLERGY MEDICINE PO) Take 1 tablet by mouth Daily.    ibuprofen (ADVIL,MOTRIN) 200 MG tablet Take 1 tablet by mouth Every 6 (Six) Hours As Needed for Mild Pain.    ofloxacin (FLOXIN) 0.3 % otic solution Administer 10 drops to the right ear Daily.    OXcarbazepine (Trileptal) 150 MG tablet Take 1 tablet by mouth 2 (Two) Times a Day.     No current facility-administered medications on file prior to visit.       Immunization History   Administered Date(s) Administered    COVID-19 (MODERNA) 1st,2nd,3rd Dose Monovalent 04/08/2022    COVID-19 (MODERNA) Monovalent Original Booster 01/07/2021, 02/02/2021    Flublok 18+yrs 10/06/2023    Flucelvax Quad Vial =>4yrs 10/21/2021    Fluzone  >6mos 10/08/2024    Fluzone (or Fluarix & Flulaval for VFC) >6mos 10/31/2022    Hepatitis A 06/04/2018    Influenza Seasonal Injectable 10/01/2016    Influenza, Unspecified 10/01/2016    Tdap 03/05/2025       Review of  "Systems   Constitutional:  Negative for fever.   HENT:  Positive for congestion, ear discharge (clear) and ear pain (R).    Respiratory:  Negative for cough and shortness of breath.    Cardiovascular:  Negative for chest pain.        Objective     Vitals:    07/14/25 1406   BP: 151/86   Pulse: 80   Temp: 95.1 °F (35.1 °C)   SpO2: 99%   Weight: 122 kg (270 lb)   Height: 159 cm (62.6\")            Physical Exam  Constitutional:       General: She is not in acute distress.     Appearance: Normal appearance.   HENT:      Right Ear: Tympanic membrane, ear canal and external ear normal.      Left Ear: Tympanic membrane, ear canal and external ear normal.      Nose: Nose normal.      Mouth/Throat:      Pharynx: Oropharynx is clear. No posterior oropharyngeal erythema.   Cardiovascular:      Rate and Rhythm: Normal rate and regular rhythm.      Heart sounds: No murmur heard.  Pulmonary:      Effort: Pulmonary effort is normal.      Breath sounds: Normal breath sounds.   Lymphadenopathy:      Cervical: No cervical adenopathy.   Neurological:      Mental Status: She is alert.   Psychiatric:         Mood and Affect: Mood normal.         Behavior: Behavior normal.         Result Review :     The following data was reviewed by: RISA Jones on 07/14/2025:                       Assessment and Plan      Assessment & Plan  Elevated blood pressure reading  Needs to have her blood pressure rechecked        Dysfunction of right eustachian tube  Reviewed UC note, She has used flonase before to restart this daily, do frequent ear popping, let me know if not improving                            Follow Up     Return if symptoms worsen or fail to improve.    Patient was given instructions and counseling regarding her condition or for health maintenance advice. Please see specific information pulled into the AVS if appropriate.       "

## 2025-08-27 DIAGNOSIS — F41.8 MIXED ANXIETY AND DEPRESSIVE DISORDER: ICD-10-CM

## 2025-08-27 RX ORDER — BUSPIRONE HYDROCHLORIDE 10 MG/1
10 TABLET ORAL NIGHTLY
Qty: 90 TABLET | Refills: 0 | Status: SHIPPED | OUTPATIENT
Start: 2025-08-27

## (undated) DEVICE — LAPAROVUE VISIBILITY SYSTEM LAPAROSCOPIC SOLUTIONS: Brand: LAPAROVUE

## (undated) DEVICE — ADAPT CLN BIOGUARD AIR/H2O DISP

## (undated) DEVICE — KT ORCA ORCAPOD DISP STRL

## (undated) DEVICE — DRAPE,REIN 53X77,STERILE: Brand: MEDLINE

## (undated) DEVICE — LAPAROSCOPIC SMOKE FILTRATION SYSTEM: Brand: PALL LAPAROSHIELD® PLUS LAPAROSCOPIC SMOKE FILTRATION SYSTEM

## (undated) DEVICE — CONTAINER,SPECIMEN,OR STERILE,4OZ: Brand: MEDLINE

## (undated) DEVICE — SUT VIC 5/0 PS2 18IN J495H

## (undated) DEVICE — DISPOSABLE MONOPOLAR ENDOSCOPIC CORD 10 FT. (3M): Brand: KIRWAN

## (undated) DEVICE — CATH IV INSYTE AUTOGARD 14G 1 1/2IN ORNG

## (undated) DEVICE — EXTENSION SET, MALE LUER LOCK ADAPTER WITH RETRACTABLE COLLAR

## (undated) DEVICE — ENDOPATH XCEL BLADELESS TROCARS WITH STABILITY SLEEVES: Brand: ENDOPATH XCEL

## (undated) DEVICE — TUBING, SUCTION, 1/4" X 10', STRAIGHT: Brand: MEDLINE

## (undated) DEVICE — SUT VIC 3/0 CT2 27IN J232H

## (undated) DEVICE — SOL NACL 0.9PCT 1000ML

## (undated) DEVICE — SUT VIC 0 TN 27IN DYED JTN0G

## (undated) DEVICE — LOU LAP CHOLE: Brand: MEDLINE INDUSTRIES, INC.

## (undated) DEVICE — STPCK 3WY D201 DISCOFIX

## (undated) DEVICE — CATH CHOLANG 4.5F18IN BRGNDY

## (undated) DEVICE — ENDOCUT SCISSOR TIP, DISPOSABLE: Brand: RENEW

## (undated) DEVICE — SENSR O2 OXIMAX FNGR A/ 18IN NONSTR

## (undated) DEVICE — LN SMPL CO2 SHTRM SD STREAM W/M LUER

## (undated) DEVICE — NDL HYPO ECLPS SFTY 22G 1 1/2IN

## (undated) DEVICE — CANN O2 ETCO2 FITS ALL CONN CO2 SMPL A/ 7IN DISP LF

## (undated) DEVICE — APPL CHLORAPREP HI/LITE 26ML ORNG

## (undated) DEVICE — ENDOPATH PNEUMONEEDLE INSUFFLATION NEEDLES WITH LUER LOCK CONNECTORS 120MM: Brand: ENDOPATH

## (undated) DEVICE — ENDOPOUCH RETRIEVER SPECIMEN RETRIEVAL BAGS: Brand: ENDOPOUCH RETRIEVER

## (undated) DEVICE — GLV SURG BIOGEL M LTX PF 6 1/2